# Patient Record
Sex: FEMALE | Race: WHITE | NOT HISPANIC OR LATINO | Employment: STUDENT | URBAN - METROPOLITAN AREA
[De-identification: names, ages, dates, MRNs, and addresses within clinical notes are randomized per-mention and may not be internally consistent; named-entity substitution may affect disease eponyms.]

---

## 2023-07-21 LAB — CHORIOGONADOTROPIN (MIU/ML) IN SER/PLAS: 75 MIU/ML

## 2023-11-09 ENCOUNTER — INITIAL PRENATAL (OUTPATIENT)
Dept: OBSTETRICS AND GYNECOLOGY | Facility: CLINIC | Age: 28
End: 2023-11-09
Payer: COMMERCIAL

## 2023-11-09 DIAGNOSIS — O21.9 NAUSEA AND VOMITING IN PREGNANCY PRIOR TO 22 WEEKS GESTATION (HHS-HCC): ICD-10-CM

## 2023-11-09 DIAGNOSIS — R51.9 PREGNANCY HEADACHE IN FIRST TRIMESTER (HHS-HCC): ICD-10-CM

## 2023-11-09 DIAGNOSIS — N85.A UTERINE SCAR FROM PREVIOUS CESAREAN DELIVERY: ICD-10-CM

## 2023-11-09 DIAGNOSIS — Z34.81 MULTIGRAVIDA IN FIRST TRIMESTER (HHS-HCC): Primary | ICD-10-CM

## 2023-11-09 DIAGNOSIS — O26.891 PREGNANCY HEADACHE IN FIRST TRIMESTER (HHS-HCC): ICD-10-CM

## 2023-11-09 DIAGNOSIS — Z3A.08 8 WEEKS GESTATION OF PREGNANCY (HHS-HCC): ICD-10-CM

## 2023-11-09 PROCEDURE — 0500F INITIAL PRENATAL CARE VISIT: CPT | Performed by: MIDWIFE

## 2023-11-09 RX ORDER — PRENATAL WITH FERROUS FUM AND FOLIC ACID 3080; 920; 120; 400; 22; 1.84; 3; 20; 10; 1; 12; 200; 27; 25; 2 [IU]/1; [IU]/1; MG/1; [IU]/1; MG/1; MG/1; MG/1; MG/1; MG/1; MG/1; UG/1; MG/1; MG/1; MG/1; MG/1
1 TABLET ORAL DAILY
Status: ON HOLD | COMMUNITY
Start: 2023-11-04

## 2023-11-09 NOTE — PROGRESS NOTES
"S: Presents to office to establish prenatal care. Pregnancy planned and her and partner (Se) are very excited, but nervous given her recent loss 2023. Reports previous c/s in  at [facility] was due to \"baby got stuck.\"    -Denies h/o HDP, GDM, PPH, blood transfusion  -Denies h/o abnormal Pap or any h/o gynecologic -surgeries/procedures  -Taking PNVs  -Symptoms include headaches, nausea    O: See flow sheets    A: 29yo  at 8.2 per LMP    P: Reviewed Bp, weight      Oriented to collaborative practice with [facility] delivery and fluid on-call schedule      Discussed prenatal visit schedule      New OB labs      Dating US      Discussed genetic options --> Will consider      Discussed comfort measures for n/v in pregnancy      Discussed comfort measures for h/a in pregnancy --> Recommended 400mg magnesium daily       New OB folder with proof of pregnancy/dental letter      Pap due 2024      RTO in 4 weeks and sooner PRN      Next visit: Review labs, ultrasound  "

## 2023-11-10 ENCOUNTER — LAB (OUTPATIENT)
Dept: LAB | Facility: LAB | Age: 28
End: 2023-11-10
Payer: COMMERCIAL

## 2023-11-10 DIAGNOSIS — Z3A.08 8 WEEKS GESTATION OF PREGNANCY (HHS-HCC): ICD-10-CM

## 2023-11-10 LAB
ABO GROUP (TYPE) IN BLOOD: NORMAL
ANTIBODY SCREEN: NORMAL
APPEARANCE UR: CLEAR
BILIRUB UR STRIP.AUTO-MCNC: NEGATIVE MG/DL
COLOR UR: YELLOW
ERYTHROCYTE [DISTWIDTH] IN BLOOD BY AUTOMATED COUNT: 13.6 % (ref 11.5–14.5)
GLUCOSE UR STRIP.AUTO-MCNC: NEGATIVE MG/DL
HBV SURFACE AG SERPL QL IA: NONREACTIVE
HCT VFR BLD AUTO: 43.7 % (ref 36–46)
HCV AB SER QL: NONREACTIVE
HGB BLD-MCNC: 14 G/DL (ref 12–16)
HIV 1+2 AB+HIV1 P24 AG SERPL QL IA: NONREACTIVE
KETONES UR STRIP.AUTO-MCNC: ABNORMAL MG/DL
LEUKOCYTE ESTERASE UR QL STRIP.AUTO: NEGATIVE
MCH RBC QN AUTO: 27 PG (ref 26–34)
MCHC RBC AUTO-ENTMCNC: 32 G/DL (ref 32–36)
MCV RBC AUTO: 84 FL (ref 80–100)
NITRITE UR QL STRIP.AUTO: NEGATIVE
NRBC BLD-RTO: 0 /100 WBCS (ref 0–0)
PH UR STRIP.AUTO: 5 [PH]
PLATELET # BLD AUTO: 282 X10*3/UL (ref 150–450)
PROT UR STRIP.AUTO-MCNC: NEGATIVE MG/DL
RBC # BLD AUTO: 5.18 X10*6/UL (ref 4–5.2)
RBC # UR STRIP.AUTO: NEGATIVE /UL
RH FACTOR (ANTIGEN D): NORMAL
RUBV IGG SERPL IA-ACNC: 1.7 IA
RUBV IGG SERPL QL IA: POSITIVE
SP GR UR STRIP.AUTO: 1.03
T PALLIDUM AB SER QL: NONREACTIVE
UROBILINOGEN UR STRIP.AUTO-MCNC: <2 MG/DL
WBC # BLD AUTO: 10.1 X10*3/UL (ref 4.4–11.3)

## 2023-11-10 PROCEDURE — 86803 HEPATITIS C AB TEST: CPT

## 2023-11-10 PROCEDURE — 86850 RBC ANTIBODY SCREEN: CPT

## 2023-11-10 PROCEDURE — 36415 COLL VENOUS BLD VENIPUNCTURE: CPT

## 2023-11-10 PROCEDURE — 87661 TRICHOMONAS VAGINALIS AMPLIF: CPT

## 2023-11-10 PROCEDURE — 85027 COMPLETE CBC AUTOMATED: CPT

## 2023-11-10 PROCEDURE — 86901 BLOOD TYPING SEROLOGIC RH(D): CPT

## 2023-11-10 PROCEDURE — 87340 HEPATITIS B SURFACE AG IA: CPT

## 2023-11-10 PROCEDURE — 86317 IMMUNOASSAY INFECTIOUS AGENT: CPT

## 2023-11-10 PROCEDURE — 87389 HIV-1 AG W/HIV-1&-2 AB AG IA: CPT

## 2023-11-10 PROCEDURE — 81003 URINALYSIS AUTO W/O SCOPE: CPT

## 2023-11-10 PROCEDURE — 86780 TREPONEMA PALLIDUM: CPT

## 2023-11-10 PROCEDURE — 87086 URINE CULTURE/COLONY COUNT: CPT

## 2023-11-10 PROCEDURE — 87800 DETECT AGNT MULT DNA DIREC: CPT

## 2023-11-10 PROCEDURE — 86900 BLOOD TYPING SEROLOGIC ABO: CPT

## 2023-11-11 LAB
BACTERIA UR CULT: NORMAL
C TRACH RRNA SPEC QL NAA+PROBE: NEGATIVE
N GONORRHOEA DNA SPEC QL PROBE+SIG AMP: NEGATIVE
REFLEX ADDED, ANEMIA PANEL: NORMAL
T VAGINALIS RRNA SPEC QL NAA+PROBE: NEGATIVE

## 2023-11-13 ENCOUNTER — HOSPITAL ENCOUNTER (OUTPATIENT)
Dept: RADIOLOGY | Facility: HOSPITAL | Age: 28
Discharge: HOME | End: 2023-11-13
Payer: COMMERCIAL

## 2023-11-13 DIAGNOSIS — Z3A.08 8 WEEKS GESTATION OF PREGNANCY (HHS-HCC): ICD-10-CM

## 2023-11-13 PROCEDURE — 76801 OB US < 14 WKS SINGLE FETUS: CPT

## 2023-11-13 PROCEDURE — 76801 OB US < 14 WKS SINGLE FETUS: CPT | Performed by: OBSTETRICS & GYNECOLOGY

## 2023-12-04 ENCOUNTER — TELEPHONE (OUTPATIENT)
Dept: OBSTETRICS AND GYNECOLOGY | Facility: CLINIC | Age: 28
End: 2023-12-04
Payer: COMMERCIAL

## 2023-12-04 DIAGNOSIS — Z3A.11 11 WEEKS GESTATION OF PREGNANCY (HHS-HCC): ICD-10-CM

## 2023-12-07 ENCOUNTER — ROUTINE PRENATAL (OUTPATIENT)
Dept: OBSTETRICS AND GYNECOLOGY | Facility: CLINIC | Age: 28
End: 2023-12-07
Payer: COMMERCIAL

## 2023-12-07 VITALS — SYSTOLIC BLOOD PRESSURE: 108 MMHG | BODY MASS INDEX: 26.31 KG/M2 | DIASTOLIC BLOOD PRESSURE: 63 MMHG | WEIGHT: 168 LBS

## 2023-12-07 DIAGNOSIS — Z3A.12 12 WEEKS GESTATION OF PREGNANCY (HHS-HCC): ICD-10-CM

## 2023-12-07 DIAGNOSIS — Z34.82 MULTIGRAVIDA IN SECOND TRIMESTER (HHS-HCC): Primary | ICD-10-CM

## 2023-12-07 PROCEDURE — 0501F PRENATAL FLOW SHEET: CPT | Performed by: MIDWIFE

## 2023-12-07 NOTE — PROGRESS NOTES
S: EMILIA. Denies SOL/ROM. Intermittent nausea and headaches.     O: See flow sheets    A: 27yo  at 12.2 per LMP c/w 9.6 week US      H/A in pregnancy      Nausea in pregnancy    P: Reviewed BP, weight      Reviewed WNL labs       Reviewed WNL imaging --> US supports LMP dating      Covid series: Completed      Flu vaccine: Declined      Discussed comfort measures for h/a in pregnancy      First check+ ordered      RTO in 4 weeks and sooner PRN      Next visit: Review first check+

## 2023-12-13 ENCOUNTER — HOSPITAL ENCOUNTER (OUTPATIENT)
Dept: RADIOLOGY | Facility: HOSPITAL | Age: 28
Discharge: HOME | End: 2023-12-13
Payer: COMMERCIAL

## 2023-12-13 DIAGNOSIS — Z3A.12 12 WEEKS GESTATION OF PREGNANCY (HHS-HCC): ICD-10-CM

## 2023-12-13 PROCEDURE — 76801 OB US < 14 WKS SINGLE FETUS: CPT

## 2023-12-13 PROCEDURE — 76815 OB US LIMITED FETUS(S): CPT | Performed by: OBSTETRICS & GYNECOLOGY

## 2024-01-04 ENCOUNTER — ROUTINE PRENATAL (OUTPATIENT)
Dept: OBSTETRICS AND GYNECOLOGY | Facility: CLINIC | Age: 29
End: 2024-01-04
Payer: COMMERCIAL

## 2024-01-04 VITALS — BODY MASS INDEX: 26.63 KG/M2 | WEIGHT: 170 LBS | DIASTOLIC BLOOD PRESSURE: 72 MMHG | SYSTOLIC BLOOD PRESSURE: 104 MMHG

## 2024-01-04 DIAGNOSIS — Z3A.16 16 WEEKS GESTATION OF PREGNANCY (HHS-HCC): ICD-10-CM

## 2024-01-04 DIAGNOSIS — Z34.82 MULTIGRAVIDA IN SECOND TRIMESTER (HHS-HCC): Primary | ICD-10-CM

## 2024-01-04 PROCEDURE — 0501F PRENATAL FLOW SHEET: CPT | Performed by: MIDWIFE

## 2024-01-04 NOTE — PROGRESS NOTES
S: EMILIA. Denies SOL/ROM. Endorses good movement! Went to a 3-d US and was told she's having a BOY.    O: See flow sheets    A: 27yo  at 16.2 per LMP c/w 9.6 week US    P: Reviewed BP, weight      Reviewed WNL first check US      NIPS ordered with instructions      Anatomy scan as scheduled the th      RTO in 4 weeks and sooner PRN       Next visit: Review anatomy, NIPS

## 2024-01-05 ENCOUNTER — LAB (OUTPATIENT)
Dept: LAB | Facility: LAB | Age: 29
End: 2024-01-05
Payer: COMMERCIAL

## 2024-01-05 DIAGNOSIS — Z3A.16 16 WEEKS GESTATION OF PREGNANCY (HHS-HCC): ICD-10-CM

## 2024-01-05 PROCEDURE — 36415 COLL VENOUS BLD VENIPUNCTURE: CPT

## 2024-01-29 ENCOUNTER — HOSPITAL ENCOUNTER (OUTPATIENT)
Dept: RADIOLOGY | Facility: HOSPITAL | Age: 29
Discharge: HOME | End: 2024-01-29
Payer: COMMERCIAL

## 2024-01-29 DIAGNOSIS — Z34.90 ENCOUNTER FOR SUPERVISION OF NORMAL PREGNANCY, UNSPECIFIED, UNSPECIFIED TRIMESTER (HHS-HCC): ICD-10-CM

## 2024-01-29 DIAGNOSIS — Z3A.12 12 WEEKS GESTATION OF PREGNANCY (HHS-HCC): ICD-10-CM

## 2024-01-29 PROCEDURE — 76805 OB US >/= 14 WKS SNGL FETUS: CPT

## 2024-01-29 PROCEDURE — 76811 OB US DETAILED SNGL FETUS: CPT | Performed by: OBSTETRICS & GYNECOLOGY

## 2024-01-31 ENCOUNTER — ROUTINE PRENATAL (OUTPATIENT)
Dept: OBSTETRICS AND GYNECOLOGY | Facility: CLINIC | Age: 29
End: 2024-01-31
Payer: COMMERCIAL

## 2024-01-31 VITALS — BODY MASS INDEX: 27.06 KG/M2 | DIASTOLIC BLOOD PRESSURE: 71 MMHG | WEIGHT: 172.8 LBS | SYSTOLIC BLOOD PRESSURE: 116 MMHG

## 2024-01-31 DIAGNOSIS — Z34.82 MULTIGRAVIDA IN SECOND TRIMESTER (HHS-HCC): Primary | ICD-10-CM

## 2024-01-31 DIAGNOSIS — O34.219 PREVIOUS CESAREAN SECTION COMPLICATING PREGNANCY (HHS-HCC): ICD-10-CM

## 2024-01-31 DIAGNOSIS — Z3A.20 20 WEEKS GESTATION OF PREGNANCY (HHS-HCC): ICD-10-CM

## 2024-01-31 PROCEDURE — 0501F PRENATAL FLOW SHEET: CPT | Performed by: MIDWIFE

## 2024-01-31 NOTE — PROGRESS NOTES
S: EMILIA. Denies sol/rom. Endorses flutters. Feels great!    O: See flow sheets    A: 27yo  at 20.1 per LMP c/w 9.6 week US      Previous c/s        P: Reviewed BP, weight      Reviewed/discussed WNL anatomy scan      rrNIPS --> Discussed results and a copy provided to patient      IT'S A BOY!      Flu Vaccine: Declined      RTO in 4 weeks and sooner PRN      EMILIA in 8 weeks scheduled with physician for first time TOLAC consent per guidelines --> MFMU 74.9&

## 2024-02-29 ENCOUNTER — ROUTINE PRENATAL (OUTPATIENT)
Dept: OBSTETRICS AND GYNECOLOGY | Facility: CLINIC | Age: 29
End: 2024-02-29
Payer: COMMERCIAL

## 2024-02-29 VITALS — SYSTOLIC BLOOD PRESSURE: 114 MMHG | WEIGHT: 176 LBS | BODY MASS INDEX: 27.57 KG/M2 | DIASTOLIC BLOOD PRESSURE: 60 MMHG

## 2024-02-29 DIAGNOSIS — N85.A UTERINE SCAR FROM PREVIOUS CESAREAN DELIVERY: ICD-10-CM

## 2024-02-29 DIAGNOSIS — Z3A.24 24 WEEKS GESTATION OF PREGNANCY (HHS-HCC): Primary | ICD-10-CM

## 2024-02-29 DIAGNOSIS — Z34.82 MULTIGRAVIDA IN SECOND TRIMESTER (HHS-HCC): ICD-10-CM

## 2024-02-29 PROCEDURE — 0501F PRENATAL FLOW SHEET: CPT | Performed by: MIDWIFE

## 2024-02-29 NOTE — PROGRESS NOTES
S: EMILIA. Denies SOL/ROM. Endorses good fetal movement.    O: See flow sheets    A: 27yo  at 24.2 per LMP c/w 9.6 week US      Previous C/S    P: Reviewed BP, weight      28 week folder provided, discussed      Plans for L/D: NCB (TOLAC), breastfeeding, unsure pediatrician, No circ      Postpartum birth control: Would like BTL      Discussed options for BTL: [facility] delivery with postpartum BTL vs outpatient after postpartum recovery      Private insurance --> 30 day sterilization consent not needed      28 week labs ordered with instructions      RTO in 4 weeks and sooner PRN      Next visit with Dr. Shook for TOLAC consent and BTL consultation

## 2024-03-25 ENCOUNTER — APPOINTMENT (OUTPATIENT)
Dept: LAB | Facility: LAB | Age: 29
End: 2024-03-25
Payer: COMMERCIAL

## 2024-03-25 ENCOUNTER — LAB (OUTPATIENT)
Dept: LAB | Facility: LAB | Age: 29
End: 2024-03-25
Payer: COMMERCIAL

## 2024-03-25 DIAGNOSIS — Z3A.24 24 WEEKS GESTATION OF PREGNANCY (HHS-HCC): ICD-10-CM

## 2024-03-25 LAB
ERYTHROCYTE [DISTWIDTH] IN BLOOD BY AUTOMATED COUNT: 14 % (ref 11.5–14.5)
GLUCOSE 1H P 50 G GLC PO SERPL-MCNC: 112 MG/DL
HCT VFR BLD AUTO: 42.1 % (ref 36–46)
HGB BLD-MCNC: 13.2 G/DL (ref 12–16)
MCH RBC QN AUTO: 27.2 PG (ref 26–34)
MCHC RBC AUTO-ENTMCNC: 31.4 G/DL (ref 32–36)
MCV RBC AUTO: 87 FL (ref 80–100)
NRBC BLD-RTO: 0 /100 WBCS (ref 0–0)
PLATELET # BLD AUTO: 213 X10*3/UL (ref 150–450)
RBC # BLD AUTO: 4.86 X10*6/UL (ref 4–5.2)
REFLEX ADDED, ANEMIA PANEL: NORMAL
TREPONEMA PALLIDUM IGG+IGM AB [PRESENCE] IN SERUM OR PLASMA BY IMMUNOASSAY: NONREACTIVE
WBC # BLD AUTO: 11.6 X10*3/UL (ref 4.4–11.3)

## 2024-03-25 PROCEDURE — 82947 ASSAY GLUCOSE BLOOD QUANT: CPT

## 2024-03-25 PROCEDURE — 85027 COMPLETE CBC AUTOMATED: CPT

## 2024-03-25 PROCEDURE — 86780 TREPONEMA PALLIDUM: CPT

## 2024-03-25 PROCEDURE — 36415 COLL VENOUS BLD VENIPUNCTURE: CPT

## 2024-03-27 ENCOUNTER — APPOINTMENT (OUTPATIENT)
Dept: OBSTETRICS AND GYNECOLOGY | Facility: CLINIC | Age: 29
End: 2024-03-27
Payer: COMMERCIAL

## 2024-03-28 ENCOUNTER — ROUTINE PRENATAL (OUTPATIENT)
Dept: OBSTETRICS AND GYNECOLOGY | Facility: CLINIC | Age: 29
End: 2024-03-28
Payer: COMMERCIAL

## 2024-03-28 VITALS
DIASTOLIC BLOOD PRESSURE: 70 MMHG | SYSTOLIC BLOOD PRESSURE: 110 MMHG | WEIGHT: 180.5 LBS | BODY MASS INDEX: 30.07 KG/M2 | HEIGHT: 65 IN

## 2024-03-28 DIAGNOSIS — Z30.09 GENERAL COUNSELING AND ADVICE FOR CONTRACEPTIVE MANAGEMENT: ICD-10-CM

## 2024-03-28 DIAGNOSIS — Z3A.28 28 WEEKS GESTATION OF PREGNANCY (HHS-HCC): ICD-10-CM

## 2024-03-28 DIAGNOSIS — N85.A UTERINE SCAR FROM PREVIOUS CESAREAN DELIVERY: Primary | ICD-10-CM

## 2024-03-28 PROCEDURE — 0501F PRENATAL FLOW SHEET: CPT | Performed by: STUDENT IN AN ORGANIZED HEALTH CARE EDUCATION/TRAINING PROGRAM

## 2024-03-28 NOTE — PROGRESS NOTES
Subjective   Patient ID [id number]   Marty Biggs is a 28 y.o.  at 28w2d with a working estimated date of delivery of 2024, by Last Menstrual Period who presents for a routine prenatal visit. Good FM, no OB complaints.    Objective   Physical Exam  Vitals:    24 1550   BP: 110/70      Weight: 81.9 kg (180 lb 8 oz), Pregravid BMI: 28.29  Expected Total Weight Gain: 7 kg (15 lb)-11.5 kg (25 lb)   Fundal height and FHR per prenatal flowsheet    Assessment/Plan     Marty Biggs is a 28 y.o.  at 28w2d with a working estimated date of delivery of 2024, by Last Menstrual Period who presents for a routine prenatal visit.    Reviewed second trimester labs, WNL. Discussed tdap, handout provided - patient declines today but will consider at next visit.    Reviewed delivery planning at length including r/b/a to TOLAC vs planned rCS. Patient reports prior  under GETA (failed epidural) for NRFHT in second stage. Thinks she pushed 2-3 times and then was taken for urgent . Op report not available for review - encouraged to sign ROR today. Explained that a successful  is associated with faster recovery, lower risk of abnormal placentation in future pregnancies, and lower risk of infection, PPH, and VTE. Reviewed risks of  including but not limited to bleeding, infection, damage to surrounding organs, and VTE. Did discuss benefit of planned rCS including scheduled delivery date Discussed that failed TOLAC is associated with higher risk than either successful  or scheduled rCS and that therefore overall risk is integrally related to a women's probability of achieving . Finally, reviewed 0.5-0.9% risk of uterine rupture which would require emergent  and is associated with increased risk of surgical complications and  morbidity. As a result, recommend CEFM and IV access in labor. MFMU score calculated with patient, 74%. Discussed that women with  at least a 60-70% likelihood of achieving  who attempt TOLAC experience the same or less maternal morbidity than women who have an elective rCS. Additionally discussed MFMU score of 70% or higher is associated with similar  morbidity between women attempting TOLAC and women planning an elective rCS. Patient would accept blood transfusion in case of emergency. Discussed that epidural is not required for TOLAC but that general anesthesia would be required if emergent  were needed. Patient expressed understanding and desires TOLAC, consent signed today.    Also discussed bilateral salpingectomy at length. Reviewed permanence, efficacy, risk of ectopic, surgical risk, lack of menstrual benefit or STI prevention, and increased risk of regret in patients under the age of 30. Discussed timing including postpartum (would need [place] [facility] and interval as well as anticipated recovery time. Also discussed alternatives to tubal including vasectomy and LARC. Patient to consider her options and let office know if she would like to be scheduled for interval tubal.    Follow up in 2 weeks for a routine prenatal visit.    40 minutes were spent in the care of this patient. 5 minutes reviewing records, 30 minutes face-to-face, and 5 minutes documenting.    Alice Shook MD

## 2024-04-10 ENCOUNTER — ROUTINE PRENATAL (OUTPATIENT)
Dept: OBSTETRICS AND GYNECOLOGY | Facility: CLINIC | Age: 29
End: 2024-04-10
Payer: COMMERCIAL

## 2024-04-10 VITALS — DIASTOLIC BLOOD PRESSURE: 60 MMHG | SYSTOLIC BLOOD PRESSURE: 102 MMHG | WEIGHT: 181.6 LBS | BODY MASS INDEX: 30.22 KG/M2

## 2024-04-10 DIAGNOSIS — Z20.818 STREP THROAT EXPOSURE: ICD-10-CM

## 2024-04-10 DIAGNOSIS — Z34.83 MULTIGRAVIDA IN THIRD TRIMESTER (HHS-HCC): ICD-10-CM

## 2024-04-10 DIAGNOSIS — Z3A.30 30 WEEKS GESTATION OF PREGNANCY (HHS-HCC): Primary | ICD-10-CM

## 2024-04-10 PROCEDURE — 90715 TDAP VACCINE 7 YRS/> IM: CPT | Performed by: MIDWIFE

## 2024-04-10 PROCEDURE — 90471 IMMUNIZATION ADMIN: CPT | Performed by: MIDWIFE

## 2024-04-10 PROCEDURE — 0501F PRENATAL FLOW SHEET: CPT | Performed by: MIDWIFE

## 2024-04-10 RX ORDER — BREAST PUMP
EACH MISCELLANEOUS
Qty: 1 EACH | Refills: 0 | Status: SHIPPED | OUTPATIENT
Start: 2024-04-10

## 2024-04-10 RX ORDER — AMOXICILLIN 500 MG/1
500 TABLET, FILM COATED ORAL 2 TIMES DAILY
Qty: 20 TABLET | Refills: 0 | Status: SHIPPED | OUTPATIENT
Start: 2024-04-10 | End: 2024-04-20

## 2024-04-10 NOTE — PROGRESS NOTES
S: EMILIA. Denies SOL/ROM. Endorses good fetal movement. Has s/s strep throat. 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    O: See flow sheets    A: 29yo  at 30.1 per LMP c/w 9.6 week US      Strep throat    P: Reviewed BP, weight      Tdap: Given      Reviewed TOLAC consent with Dr. Shook --> MFMU 74%      Reviewed WNL 28 week testing      Amoxicillin to pharmacy      Breast pump ordered to pharmacy      RTO in 2 weeks and sooner PRN

## 2024-04-11 ENCOUNTER — APPOINTMENT (OUTPATIENT)
Dept: OBSTETRICS AND GYNECOLOGY | Facility: CLINIC | Age: 29
End: 2024-04-11
Payer: COMMERCIAL

## 2024-04-24 ENCOUNTER — ROUTINE PRENATAL (OUTPATIENT)
Dept: OBSTETRICS AND GYNECOLOGY | Facility: CLINIC | Age: 29
End: 2024-04-24
Payer: COMMERCIAL

## 2024-04-24 VITALS — DIASTOLIC BLOOD PRESSURE: 74 MMHG | BODY MASS INDEX: 30.55 KG/M2 | SYSTOLIC BLOOD PRESSURE: 118 MMHG | WEIGHT: 183.6 LBS

## 2024-04-24 DIAGNOSIS — N85.A UTERINE SCAR FROM PREVIOUS CESAREAN DELIVERY: ICD-10-CM

## 2024-04-24 DIAGNOSIS — Z3A.32 32 WEEKS GESTATION OF PREGNANCY (HHS-HCC): ICD-10-CM

## 2024-04-24 DIAGNOSIS — Z34.83 MULTIGRAVIDA IN THIRD TRIMESTER (HHS-HCC): Primary | ICD-10-CM

## 2024-04-24 PROCEDURE — 0501F PRENATAL FLOW SHEET: CPT | Performed by: MIDWIFE

## 2024-04-24 NOTE — PROGRESS NOTES
S: EMILIA. Denies sol/rom. Endorses good fetal movement.     O: See flow sheets    A: 30yo  at 32.1 per LMP c/w 9.6 week uS    P: Reviewed BP, weight      Discussed third trimester expectations      RTO in 2 weeks and sooner PRN      Next visit: Review/discuss previous op report and MFMU, arrest of descent?

## 2024-04-25 ENCOUNTER — APPOINTMENT (OUTPATIENT)
Dept: OBSTETRICS AND GYNECOLOGY | Facility: CLINIC | Age: 29
End: 2024-04-25
Payer: COMMERCIAL

## 2024-05-08 ENCOUNTER — ROUTINE PRENATAL (OUTPATIENT)
Dept: OBSTETRICS AND GYNECOLOGY | Facility: CLINIC | Age: 29
End: 2024-05-08
Payer: COMMERCIAL

## 2024-05-08 VITALS — WEIGHT: 184.2 LBS | BODY MASS INDEX: 30.65 KG/M2 | DIASTOLIC BLOOD PRESSURE: 70 MMHG | SYSTOLIC BLOOD PRESSURE: 112 MMHG

## 2024-05-08 DIAGNOSIS — Z3A.34 34 WEEKS GESTATION OF PREGNANCY (HHS-HCC): ICD-10-CM

## 2024-05-08 DIAGNOSIS — N85.A UTERINE SCAR FROM PREVIOUS CESAREAN DELIVERY: ICD-10-CM

## 2024-05-08 DIAGNOSIS — Z34.83 MULTIGRAVIDA IN THIRD TRIMESTER (HHS-HCC): ICD-10-CM

## 2024-05-08 PROCEDURE — 0501F PRENATAL FLOW SHEET: CPT | Performed by: MIDWIFE

## 2024-05-08 NOTE — PROGRESS NOTES
S: EMILIA. Denies sol/rom. Reports an overall change in quality of fetal movement but states she elicits a good response when she performs FKC interventions.    O: See flow sheets    A: 28yo  at 34.1 per LMP c/w 9.6 week US    P :Reviewed BP, weihgt      Counseled MFMU score after discussion with Dr. Shook. Op report states arrest disorder, which changes MFMU to 53.6%. Patient counseled and new TOLAC consent signed      FKC      RTO in 2 weeks and sooner PRN

## 2024-05-09 ENCOUNTER — APPOINTMENT (OUTPATIENT)
Dept: OBSTETRICS AND GYNECOLOGY | Facility: CLINIC | Age: 29
End: 2024-05-09
Payer: COMMERCIAL

## 2024-05-16 ENCOUNTER — ROUTINE PRENATAL (OUTPATIENT)
Dept: OBSTETRICS AND GYNECOLOGY | Facility: CLINIC | Age: 29
End: 2024-05-16
Payer: COMMERCIAL

## 2024-05-16 VITALS — BODY MASS INDEX: 30.85 KG/M2 | WEIGHT: 185.4 LBS | SYSTOLIC BLOOD PRESSURE: 114 MMHG | DIASTOLIC BLOOD PRESSURE: 68 MMHG

## 2024-05-16 DIAGNOSIS — O09.523 MULTIGRAVIDA OF ADVANCED MATERNAL AGE IN THIRD TRIMESTER (HHS-HCC): ICD-10-CM

## 2024-05-16 DIAGNOSIS — Z3A.35 35 WEEKS GESTATION OF PREGNANCY (HHS-HCC): ICD-10-CM

## 2024-05-16 PROCEDURE — 0501F PRENATAL FLOW SHEET: CPT | Performed by: MIDWIFE

## 2024-05-16 NOTE — PROGRESS NOTES
S: EMILIA. Denies sol/rom. Endorses good fetal movement. Has had a discussion with family/support after last appointment given recalculated MFMU and anxiety. Patient states she would strongly feel more comfortable proceeding with a repeat c/s.     O: See flow sheets    A: 28yo  at 35.2 per LMP c/w 9.6 week US    P: Reviewed BP, weight      Discussed patient's feelings and offered support      Scheduled next visit with Dr. Shook in 1 week. Dr. Shook notified.       RTO in 1 week and sooner PRN

## 2024-05-20 PROBLEM — Z34.90 PRENATAL CARE, ANTEPARTUM (HHS-HCC): Status: ACTIVE | Noted: 2024-05-20

## 2024-05-21 ENCOUNTER — PREP FOR PROCEDURE (OUTPATIENT)
Dept: OBSTETRICS AND GYNECOLOGY | Facility: CLINIC | Age: 29
End: 2024-05-21

## 2024-05-21 ENCOUNTER — ROUTINE PRENATAL (OUTPATIENT)
Dept: OBSTETRICS AND GYNECOLOGY | Facility: CLINIC | Age: 29
End: 2024-05-21
Payer: COMMERCIAL

## 2024-05-21 VITALS — WEIGHT: 185.6 LBS | BODY MASS INDEX: 30.89 KG/M2 | DIASTOLIC BLOOD PRESSURE: 68 MMHG | SYSTOLIC BLOOD PRESSURE: 112 MMHG

## 2024-05-21 DIAGNOSIS — O34.219 PREVIOUS CESAREAN SECTION COMPLICATING PREGNANCY (HHS-HCC): Primary | ICD-10-CM

## 2024-05-21 DIAGNOSIS — Z3A.36 36 WEEKS GESTATION OF PREGNANCY (HHS-HCC): ICD-10-CM

## 2024-05-21 DIAGNOSIS — Z34.90 PRENATAL CARE, ANTEPARTUM (HHS-HCC): ICD-10-CM

## 2024-05-21 DIAGNOSIS — N85.A UTERINE SCAR FROM PREVIOUS CESAREAN DELIVERY: Primary | ICD-10-CM

## 2024-05-21 PROCEDURE — 0501F PRENATAL FLOW SHEET: CPT | Performed by: STUDENT IN AN ORGANIZED HEALTH CARE EDUCATION/TRAINING PROGRAM

## 2024-05-21 PROCEDURE — 87081 CULTURE SCREEN ONLY: CPT

## 2024-05-21 RX ORDER — CEFAZOLIN SODIUM 2 G/100ML
2 INJECTION, SOLUTION INTRAVENOUS ONCE
Status: CANCELLED | OUTPATIENT
Start: 2024-05-21 | End: 2024-05-21

## 2024-05-21 NOTE — PROGRESS NOTES
Subjective   Patient ID [id number]   Marty Biggs is a 29 y.o.  at 36w0d with a working estimated date of delivery of 2024, by Last Menstrual Period who presents for a routine prenatal visit. Good FM, no OB complaints.    Objective   Physical Exam  Vitals:    24 0950   BP: 112/68      Weight: 84.2 kg (185 lb 9.6 oz), Pregravid BMI: 28.29  Expected Total Weight Gain: 7 kg (15 lb)-11.5 kg (25 lb)   Fundal height and FHR per prenatal flowsheet    Assessment/Plan     Marty Biggs is a 29 y.o.  at 36w0d with a working estimated date of delivery of 2024, by Last Menstrual Period who presents for a routine prenatal visit.    Discussed TOLAC vs rCS again. MFMU 53.6%. Desires rCS. Reviewed risks of surgery. Agreeable to proceed. Scheduled today for  ay 8:30 AM at 39.0wga. To transfer to physician care. GBS collected, SVE 0/50/-3. Vertex confirmed on BSUS. Remainder of plan per problem list as below.     Problem List Items Addressed This Visit       Previous  section complicating pregnancy ([facility]) - Primary    Overview     -Prior LTCS for arrest of active phase, op report reviewed, confirmed low transverse  -MFMU score = 53.6%  -Desires [facility]         Prenatal care, antepartum ([facility])    Overview     -Last pap (2021): NIL/HRHPV other positive, for repeat postpartum  -s/p COVID vaccine  -Risk-reducing cfDNA  -s/p tdap  -Breast/POPs bridge to vasectomy  rCS scheduled for  at 8:30 AM with Boone    Next Visit  [ ] Sign consent          Other Visit Diagnoses       36 weeks gestation of pregnancy ([facility])        Relevant Orders    Group B Streptococcus (GBS) Prenatal Screen, Culture          Follow up in 1 weeks for a routine prenatal visit.    Alice Shook MD

## 2024-05-22 ENCOUNTER — APPOINTMENT (OUTPATIENT)
Dept: OBSTETRICS AND GYNECOLOGY | Facility: CLINIC | Age: 29
End: 2024-05-22
Payer: COMMERCIAL

## 2024-05-23 ENCOUNTER — APPOINTMENT (OUTPATIENT)
Dept: OBSTETRICS AND GYNECOLOGY | Facility: CLINIC | Age: 29
End: 2024-05-23
Payer: COMMERCIAL

## 2024-05-24 LAB — GP B STREP GENITAL QL CULT: ABNORMAL

## 2024-05-29 ENCOUNTER — APPOINTMENT (OUTPATIENT)
Dept: OBSTETRICS AND GYNECOLOGY | Facility: CLINIC | Age: 29
End: 2024-05-29
Payer: COMMERCIAL

## 2024-05-31 ENCOUNTER — ROUTINE PRENATAL (OUTPATIENT)
Dept: OBSTETRICS AND GYNECOLOGY | Facility: CLINIC | Age: 29
End: 2024-05-31
Payer: COMMERCIAL

## 2024-05-31 VITALS — DIASTOLIC BLOOD PRESSURE: 74 MMHG | BODY MASS INDEX: 31.12 KG/M2 | SYSTOLIC BLOOD PRESSURE: 110 MMHG | WEIGHT: 187 LBS

## 2024-05-31 DIAGNOSIS — Z3A.37 37 WEEKS GESTATION OF PREGNANCY (HHS-HCC): ICD-10-CM

## 2024-05-31 DIAGNOSIS — Z34.90 PRENATAL CARE, ANTEPARTUM (HHS-HCC): ICD-10-CM

## 2024-05-31 DIAGNOSIS — O34.219 PREVIOUS CESAREAN SECTION COMPLICATING PREGNANCY (HHS-HCC): Primary | ICD-10-CM

## 2024-05-31 PROCEDURE — 0501F PRENATAL FLOW SHEET: CPT | Performed by: STUDENT IN AN ORGANIZED HEALTH CARE EDUCATION/TRAINING PROGRAM

## 2024-05-31 NOTE — PROGRESS NOTES
Subjective   Patient ID [id number]   Marty Biggs is a 29 y.o.  at 37w3d with a working estimated date of delivery of 2024, by Last Menstrual Period who presents for a routine prenatal visit. Good FM, no OB complaints.    Objective   Physical Exam  Vitals:    24 0845   BP: 110/74      Weight: 84.8 kg (187 lb), Pregravid BMI: 28.29  Expected Total Weight Gain: Could not be calculated   Fundal height and FHR per prenatal flowsheet    Assessment/Plan     Marty Biggs is a 29 y.o.  at 37w3d with a working estimated date of delivery of 2024, by Last Menstrual Period who presents for a routine prenatal visit.    rCS scheduled, surgical consent signed today. Labor precautions provided. Remainder of plan per problem list as below.     Problem List Items Addressed This Visit       Previous  section complicating pregnancy ([facility]) - Primary    Overview     -Prior LTCS for arrest of active phase, op report reviewed, confirmed low transverse  -MFMU score = 53.6%  -Desires rCS, transferred to physician care at 36wga         Prenatal care, antepartum ([facility])    Overview     -Last pap (2021): NIL/HRHPV other positive, for repeat postpartum  -s/p COVID vaccine  -Risk-reducing cfDNA  -s/p tdap  -Breast/POPs bridge to vasectomy  rCS scheduled for  at 8:30 AM with Boone, surgical consent signed           Other Visit Diagnoses       37 weeks gestation of pregnancy ([facility])              Follow up in 1 weeks for a routine prenatal visit.    Alice Shook MD

## 2024-06-03 PROBLEM — N85.A UTERINE SCAR FROM PREVIOUS CESAREAN DELIVERY: Status: RESOLVED | Noted: 2024-05-21 | Resolved: 2024-06-03

## 2024-06-04 ENCOUNTER — ROUTINE PRENATAL (OUTPATIENT)
Dept: OBSTETRICS AND GYNECOLOGY | Facility: CLINIC | Age: 29
End: 2024-06-04
Payer: COMMERCIAL

## 2024-06-04 VITALS — SYSTOLIC BLOOD PRESSURE: 114 MMHG | BODY MASS INDEX: 31.12 KG/M2 | WEIGHT: 187 LBS | DIASTOLIC BLOOD PRESSURE: 83 MMHG

## 2024-06-04 DIAGNOSIS — Z3A.38 38 WEEKS GESTATION OF PREGNANCY (HHS-HCC): ICD-10-CM

## 2024-06-04 DIAGNOSIS — Z34.90 PRENATAL CARE, ANTEPARTUM (HHS-HCC): Primary | ICD-10-CM

## 2024-06-04 DIAGNOSIS — O34.219 PREVIOUS CESAREAN SECTION COMPLICATING PREGNANCY (HHS-HCC): ICD-10-CM

## 2024-06-04 PROCEDURE — 0501F PRENATAL FLOW SHEET: CPT | Performed by: STUDENT IN AN ORGANIZED HEALTH CARE EDUCATION/TRAINING PROGRAM

## 2024-06-04 NOTE — PROGRESS NOTES
Subjective   Patient ID [id number]   Marty Biggs is a 29 y.o.  at 38w0d with a working estimated date of delivery of 2024, by Last Menstrual Period who presents for a routine prenatal visit. Good FM, had some ctx last night that resolved. No LOF or VB.    Objective   Physical Exam  Vitals:    24 0949   BP: 114/83      Weight: 84.8 kg (187 lb), Pregravid BMI: 28.29  Expected Total Weight Gain: Could not be calculated   Fundal height and FHR per prenatal flowsheet    Assessment/Plan     Marty Biggs is a 29 y.o.  at 38w0d with a working estimated date of delivery of 2024, by Last Menstrual Period who presents for a routine prenatal visit.    rCS scheduled for next week. Labor precautions provided. Remainder of plan per problem list as below.     Problem List Items Addressed This Visit       Previous  section complicating pregnancy ([facility])    Overview     -Prior LTCS for arrest of active phase, op report reviewed, confirmed low transverse  -MFMU score = 53.6%  -Desires rCS, transferred to physician care at 36wga         Prenatal care, antepartum ([facility]) - Primary    Overview     -Last pap (2021): NIL/HRHPV other positive, for repeat postpartum  -s/p COVID vaccine  -Risk-reducing cfDNA  -s/p tdap  -Breast/POPs bridge to vasectomy  rCS scheduled for  at 8:30 AM with mari Shook consent signed           Other Visit Diagnoses       38 weeks gestation of pregnancy ([facility])              Follow-up postpartum    Alice Shook MD

## 2024-06-05 ENCOUNTER — APPOINTMENT (OUTPATIENT)
Dept: OBSTETRICS AND GYNECOLOGY | Facility: CLINIC | Age: 29
End: 2024-06-05
Payer: COMMERCIAL

## 2024-06-06 ENCOUNTER — HOSPITAL ENCOUNTER (OUTPATIENT)
Facility: HOSPITAL | Age: 29
Discharge: HOME | End: 2024-06-06
Attending: STUDENT IN AN ORGANIZED HEALTH CARE EDUCATION/TRAINING PROGRAM | Admitting: STUDENT IN AN ORGANIZED HEALTH CARE EDUCATION/TRAINING PROGRAM
Payer: COMMERCIAL

## 2024-06-06 ENCOUNTER — TELEPHONE (OUTPATIENT)
Dept: OBSTETRICS AND GYNECOLOGY | Facility: CLINIC | Age: 29
End: 2024-06-06

## 2024-06-06 ENCOUNTER — ANESTHESIA EVENT (OUTPATIENT)
Dept: OBSTETRICS AND GYNECOLOGY | Facility: HOSPITAL | Age: 29
End: 2024-06-06
Payer: COMMERCIAL

## 2024-06-06 VITALS
SYSTOLIC BLOOD PRESSURE: 108 MMHG | WEIGHT: 185.85 LBS | RESPIRATION RATE: 18 BRPM | TEMPERATURE: 97.2 F | HEART RATE: 88 BPM | HEIGHT: 65 IN | OXYGEN SATURATION: 98 % | BODY MASS INDEX: 30.96 KG/M2 | DIASTOLIC BLOOD PRESSURE: 67 MMHG

## 2024-06-06 PROBLEM — N85.A UTERINE SCAR FROM PREVIOUS CESAREAN DELIVERY: Status: ACTIVE | Noted: 2024-05-21

## 2024-06-06 LAB
ABO GROUP (TYPE) IN BLOOD: NORMAL
ANTIBODY SCREEN: NORMAL
ERYTHROCYTE [DISTWIDTH] IN BLOOD BY AUTOMATED COUNT: 13.5 % (ref 11.5–14.5)
HCT VFR BLD AUTO: 42.5 % (ref 36–46)
HGB BLD-MCNC: 14 G/DL (ref 12–16)
MCH RBC QN AUTO: 27.6 PG (ref 26–34)
MCHC RBC AUTO-ENTMCNC: 32.9 G/DL (ref 32–36)
MCV RBC AUTO: 84 FL (ref 80–100)
NRBC BLD-RTO: 0 /100 WBCS (ref 0–0)
PLATELET # BLD AUTO: 227 X10*3/UL (ref 150–450)
RBC # BLD AUTO: 5.07 X10*6/UL (ref 4–5.2)
RH FACTOR (ANTIGEN D): NORMAL
WBC # BLD AUTO: 15.6 X10*3/UL (ref 4.4–11.3)

## 2024-06-06 PROCEDURE — 59025 FETAL NON-STRESS TEST: CPT | Performed by: STUDENT IN AN ORGANIZED HEALTH CARE EDUCATION/TRAINING PROGRAM

## 2024-06-06 PROCEDURE — 86900 BLOOD TYPING SEROLOGIC ABO: CPT | Performed by: STUDENT IN AN ORGANIZED HEALTH CARE EDUCATION/TRAINING PROGRAM

## 2024-06-06 PROCEDURE — 86901 BLOOD TYPING SEROLOGIC RH(D): CPT | Performed by: STUDENT IN AN ORGANIZED HEALTH CARE EDUCATION/TRAINING PROGRAM

## 2024-06-06 PROCEDURE — 85027 COMPLETE CBC AUTOMATED: CPT | Performed by: STUDENT IN AN ORGANIZED HEALTH CARE EDUCATION/TRAINING PROGRAM

## 2024-06-06 PROCEDURE — 36415 COLL VENOUS BLD VENIPUNCTURE: CPT | Performed by: STUDENT IN AN ORGANIZED HEALTH CARE EDUCATION/TRAINING PROGRAM

## 2024-06-06 PROCEDURE — 86780 TREPONEMA PALLIDUM: CPT | Mod: STJLAB | Performed by: STUDENT IN AN ORGANIZED HEALTH CARE EDUCATION/TRAINING PROGRAM

## 2024-06-06 PROCEDURE — 99214 OFFICE O/P EST MOD 30 MIN: CPT | Performed by: STUDENT IN AN ORGANIZED HEALTH CARE EDUCATION/TRAINING PROGRAM

## 2024-06-06 RX ORDER — LABETALOL HYDROCHLORIDE 5 MG/ML
20 INJECTION, SOLUTION INTRAVENOUS ONCE AS NEEDED
Status: DISCONTINUED | OUTPATIENT
Start: 2024-06-06 | End: 2024-06-06 | Stop reason: HOSPADM

## 2024-06-06 RX ORDER — ONDANSETRON 4 MG/1
4 TABLET, FILM COATED ORAL EVERY 6 HOURS PRN
Status: DISCONTINUED | OUTPATIENT
Start: 2024-06-06 | End: 2024-06-06 | Stop reason: HOSPADM

## 2024-06-06 RX ORDER — NIFEDIPINE 10 MG/1
10 CAPSULE ORAL ONCE AS NEEDED
Status: DISCONTINUED | OUTPATIENT
Start: 2024-06-06 | End: 2024-06-06 | Stop reason: HOSPADM

## 2024-06-06 RX ORDER — ONDANSETRON HYDROCHLORIDE 2 MG/ML
4 INJECTION, SOLUTION INTRAVENOUS EVERY 6 HOURS PRN
Status: DISCONTINUED | OUTPATIENT
Start: 2024-06-06 | End: 2024-06-06 | Stop reason: HOSPADM

## 2024-06-06 RX ORDER — LIDOCAINE HYDROCHLORIDE 10 MG/ML
0.5 INJECTION INFILTRATION; PERINEURAL ONCE AS NEEDED
Status: DISCONTINUED | OUTPATIENT
Start: 2024-06-06 | End: 2024-06-06 | Stop reason: HOSPADM

## 2024-06-06 RX ORDER — HYDRALAZINE HYDROCHLORIDE 20 MG/ML
5 INJECTION INTRAMUSCULAR; INTRAVENOUS ONCE AS NEEDED
Status: DISCONTINUED | OUTPATIENT
Start: 2024-06-06 | End: 2024-06-06 | Stop reason: HOSPADM

## 2024-06-06 SDOH — SOCIAL STABILITY: SOCIAL INSECURITY: HAS ANYONE EVER THREATENED TO HURT YOUR FAMILY OR YOUR PETS?: NO

## 2024-06-06 SDOH — ECONOMIC STABILITY: HOUSING INSECURITY: DO YOU FEEL UNSAFE GOING BACK TO THE PLACE WHERE YOU ARE LIVING?: NO

## 2024-06-06 SDOH — SOCIAL STABILITY: SOCIAL INSECURITY: DOES ANYONE TRY TO KEEP YOU FROM HAVING/CONTACTING OTHER FRIENDS OR DOING THINGS OUTSIDE YOUR HOME?: NO

## 2024-06-06 SDOH — SOCIAL STABILITY: SOCIAL INSECURITY: ABUSE SCREEN: ADULT

## 2024-06-06 SDOH — HEALTH STABILITY: MENTAL HEALTH: NON-SPECIFIC ACTIVE SUICIDAL THOUGHTS (PAST 1 MONTH): NO

## 2024-06-06 SDOH — SOCIAL STABILITY: SOCIAL INSECURITY: ARE THERE ANY APPARENT SIGNS OF INJURIES/BEHAVIORS THAT COULD BE RELATED TO ABUSE/NEGLECT?: NO

## 2024-06-06 SDOH — HEALTH STABILITY: MENTAL HEALTH: WERE YOU ABLE TO COMPLETE ALL THE BEHAVIORAL HEALTH SCREENINGS?: YES

## 2024-06-06 SDOH — HEALTH STABILITY: MENTAL HEALTH: WISH TO BE DEAD (PAST 1 MONTH): NO

## 2024-06-06 SDOH — SOCIAL STABILITY: SOCIAL INSECURITY: PHYSICAL ABUSE: DENIES

## 2024-06-06 SDOH — SOCIAL STABILITY: SOCIAL INSECURITY: VERBAL ABUSE: DENIES

## 2024-06-06 SDOH — HEALTH STABILITY: MENTAL HEALTH: SUICIDAL BEHAVIOR (LIFETIME): NO

## 2024-06-06 SDOH — HEALTH STABILITY: MENTAL HEALTH: HAVE YOU USED ANY PRESCRIPTION DRUGS OTHER THAN PRESCRIBED IN THE PAST 12 MONTHS?: NO

## 2024-06-06 SDOH — SOCIAL STABILITY: SOCIAL INSECURITY: DO YOU FEEL ANYONE HAS EXPLOITED OR TAKEN ADVANTAGE OF YOU FINANCIALLY OR OF YOUR PERSONAL PROPERTY?: NO

## 2024-06-06 SDOH — HEALTH STABILITY: MENTAL HEALTH: HAVE YOU USED ANY SUBSTANCES (CANABIS, COCAINE, HEROIN, HALLUCINOGENS, INHALANTS, ETC.) IN THE PAST 12 MONTHS?: NO

## 2024-06-06 SDOH — SOCIAL STABILITY: SOCIAL INSECURITY: ARE YOU OR HAVE YOU BEEN THREATENED OR ABUSED PHYSICALLY, EMOTIONALLY, OR SEXUALLY BY ANYONE?: NO

## 2024-06-06 SDOH — SOCIAL STABILITY: SOCIAL INSECURITY: HAVE YOU HAD THOUGHTS OF HARMING ANYONE ELSE?: NO

## 2024-06-06 SDOH — SOCIAL STABILITY: SOCIAL INSECURITY: HAVE YOU HAD ANY THOUGHTS OF HARMING ANYONE ELSE?: NO

## 2024-06-06 ASSESSMENT — LIFESTYLE VARIABLES
AUDIT-C TOTAL SCORE: 0
HOW MANY STANDARD DRINKS CONTAINING ALCOHOL DO YOU HAVE ON A TYPICAL DAY: PATIENT DOES NOT DRINK
AUDIT-C TOTAL SCORE: 0
SKIP TO QUESTIONS 9-10: 1
HOW OFTEN DO YOU HAVE A DRINK CONTAINING ALCOHOL: NEVER
HOW OFTEN DO YOU HAVE 6 OR MORE DRINKS ON ONE OCCASION: NEVER

## 2024-06-06 ASSESSMENT — PATIENT HEALTH QUESTIONNAIRE - PHQ9
SUM OF ALL RESPONSES TO PHQ9 QUESTIONS 1 & 2: 0
2. FEELING DOWN, DEPRESSED OR HOPELESS: NOT AT ALL
1. LITTLE INTEREST OR PLEASURE IN DOING THINGS: NOT AT ALL

## 2024-06-06 ASSESSMENT — ACTIVITIES OF DAILY LIVING (ADL)
HEARING - LEFT EAR: FUNCTIONAL
GROOMING: INDEPENDENT
ADEQUATE_TO_COMPLETE_ADL: YES
WALKS IN HOME: INDEPENDENT
JUDGMENT_ADEQUATE_SAFELY_COMPLETE_DAILY_ACTIVITIES: YES
TOILETING: INDEPENDENT
FEEDING YOURSELF: INDEPENDENT
BATHING: INDEPENDENT
DRESSING YOURSELF: INDEPENDENT
PATIENT'S MEMORY ADEQUATE TO SAFELY COMPLETE DAILY ACTIVITIES?: YES
HEARING - RIGHT EAR: FUNCTIONAL

## 2024-06-06 ASSESSMENT — PAIN SCALES - GENERAL
PAINLEVEL: 3
PAINLEVEL_OUTOF10: 1
PAINLEVEL_OUTOF10: 3

## 2024-06-06 NOTE — TELEPHONE ENCOUNTER
38.2 wk ob called ob line c/o bleeding.  She is scheduled for her  on .  Patient has been experiencing  irregular contractions/cramping on and off, becoming more uncomfortable.  Patient has been experiencing pink/red bleeding, noticing on tissue and in toilet.  Denies LOF or leaking.  No recent intercourse, constipation or cervical exam. + FM    Message sent to on call physician, Dr Shook,    Please advise if she should go in to triage?     Per Dr Shook, patient advised NPO and to make her way into [facility] OB triage. Patient states she last ate breakfast earlier this morning about 3 hours ago.  Advised no further food or drink until she is evaluated in ob triage.  Patient has to wait for  to get home, maybe 1-1 1/2 hours.  Advised to find a ride in if its going to be longer than that and he can meet her at [facility].    Dr Shook made aware.

## 2024-06-06 NOTE — H&P
Obstetrical Admission History and Physical     Marty Prasad is a 29 y.o.  at 38w2d by LMP c/w 9 week scan presenting for contractions and spotting    Chief Complaint: Vaginal Bleeding (Spotting, started at 0800)    Assessment/Plan    Contractions/Spotting  -Prior LTCS x1 with plan for repeat    -SVE 1.5/80/-3 -> unchanged on 2 hour recheck  -Is having contractions on TOCO but irregular and not painful per patient  -SSE with brown mucus, no bright red blood or active bleeding  -Reactive NST  -No evidence of labor at this time. Suspicion for abruption or uterine rupture extremely low given lack of painful contractions, reassuring fetal monitoring, and only brown mucus in vault  -Normotensive  -Discussed  risk of early term delivery. In the absence of labor with otherwise reassuring maternal and fetal status, recommend expectant management at this time. Has rCS scheduled in five days.     Dispo: stable for discharge home with strict return precautions    Alice Shook MD     Active Problems:  There are no active Hospital Problems.      Pregnancy Problems (from 23 to present)       Problem Noted Resolved    Prenatal care, antepartum ([facility]) 2024 by Alice Shook MD No    Priority:  Medium      Overview Addendum 2024  9:08 AM by Alice Shook MD     -Last pap (2021): NIL/HRHPV other positive, for repeat postpartum  -s/p COVID vaccine  -Risk-reducing cfDNA  -s/p tdap  -Breast/POPs bridge to vasectomy  rCS scheduled for  at 8:30 AM with mari Shook consent signed                Subjective   Marty is here complaining of contractions and spotting. Has been having irregular contractions for days, unable to quantify them but not too painful. Started having light pink mucus spotting with wiping this AM. No pain in between contractions. Good FM, no LOF. Last ate cereal at 1000 this AM.     Pregnancy notable for:  -Prior  for arrest of active phase with  desire for repeat     Obstetrical History   OB History    Para Term  AB Living   3 1 1 0 1 1   SAB IAB Ectopic Multiple Live Births   1 0 0 0 1      # Outcome Date GA Lbr Miguel/2nd Weight Sex Delivery Anes PTL Lv   3 Current            2 Term 01/07/15 39w0d  3.459 kg F CS-Unspec Spinal N [id number]      Complications: Shoulder Dystocia, Failure to Progress in Second Stage   1 SAB  5w0d    Complete          Past Medical History  History reviewed. No pertinent past medical history.     Past Surgical History   Past Surgical History:   Procedure Laterality Date     SECTION, CLASSIC       Social History  Social History     Tobacco Use    Smoking status: Never    Smokeless tobacco: Never   Substance Use Topics    Alcohol use: Never     Substance and Sexual Activity   Drug Use Never       Allergies  Patient has no known allergies.     Medications  Medications Prior to Admission   Medication Sig Dispense Refill Last Dose    breast pump device Double electric breast pump 1 each 0     Prenatal Vitamin Plus Low Iron 27 mg iron- 1 mg tablet Take 1 tablet by mouth once daily.          Objective    Last Vitals  Temp Pulse Resp BP MAP O2 Sat   36.2 °C (97.2 °F) 101 18 125/74   98 %     Physical Examination  Gen: awake, alert  Head: NCAT  HEENT: moist mucus membranes  Pulm: breathing comfortably on room air  CV: warm and well-perfused  Abd: gravid, non-tender  Neuro: alert and oriented  Psych: appropriate affect     FHT: 140, moderate variability, + accels, -decels   TOCO: ctx q5 min  SVE: 1.5/80/-3 -> unchanged on two hour recheck  SSE: brown mucus in vault, no bright red blood or bleeding with valsalva  BSUS: cephalic    Lab Review  Labs in chart were reviewed.

## 2024-06-06 NOTE — PROCEDURES
Marty Prasad, a  at 38w2d with an JAIME of 2024, by Last Menstrual Period, was seen at [facility] for a nonstress test.    Non-Stress Test   Baseline Fetal Heart Rate for Non-Stress Test: 140 BPM  Variability in Waveform for Non-Stress Test: Moderate  Accelerations in Non-Stress Test: Yes  Decelerations in Non-Stress Test: None  Contractions in Non-Stress Test: Regular  NST Contraction Frequency: q5 min  Acoustic Stimulator for Non-Stress Test: No  Interpretation of Non-Stress Test   Interpretation of Non-Stress Test: Reactive         Alice Shook MD

## 2024-06-07 LAB — TREPONEMA PALLIDUM IGG+IGM AB [PRESENCE] IN SERUM OR PLASMA BY IMMUNOASSAY: NONREACTIVE

## 2024-06-09 ENCOUNTER — ANESTHESIA EVENT (OUTPATIENT)
Dept: OBSTETRICS AND GYNECOLOGY | Facility: HOSPITAL | Age: 29
End: 2024-06-09
Payer: COMMERCIAL

## 2024-06-09 ENCOUNTER — HOSPITAL ENCOUNTER (INPATIENT)
Facility: HOSPITAL | Age: 29
LOS: 2 days | Discharge: HOME | End: 2024-06-11
Attending: STUDENT IN AN ORGANIZED HEALTH CARE EDUCATION/TRAINING PROGRAM | Admitting: STUDENT IN AN ORGANIZED HEALTH CARE EDUCATION/TRAINING PROGRAM
Payer: COMMERCIAL

## 2024-06-09 ENCOUNTER — ANESTHESIA (OUTPATIENT)
Dept: OBSTETRICS AND GYNECOLOGY | Facility: HOSPITAL | Age: 29
End: 2024-06-09
Payer: COMMERCIAL

## 2024-06-09 ENCOUNTER — HOSPITAL ENCOUNTER (OUTPATIENT)
Facility: HOSPITAL | Age: 29
Setting detail: SURGERY ADMIT
End: 2024-06-09
Attending: STUDENT IN AN ORGANIZED HEALTH CARE EDUCATION/TRAINING PROGRAM | Admitting: STUDENT IN AN ORGANIZED HEALTH CARE EDUCATION/TRAINING PROGRAM
Payer: COMMERCIAL

## 2024-06-09 DIAGNOSIS — G89.18 POSTOPERATIVE PAIN: ICD-10-CM

## 2024-06-09 DIAGNOSIS — Z87.59: Primary | ICD-10-CM

## 2024-06-09 DIAGNOSIS — R52 POSTPARTUM PAIN (HHS-HCC): ICD-10-CM

## 2024-06-09 DIAGNOSIS — K59.00 CONSTIPATION, UNSPECIFIED CONSTIPATION TYPE: ICD-10-CM

## 2024-06-09 LAB
ABO GROUP (TYPE) IN BLOOD: NORMAL
ANTIBODY SCREEN: NORMAL
BASE EXCESS BLDCOA CALC-SCNC: -4.5 MMOL/L (ref -10.8–-0.5)
BASE EXCESS BLDCOV CALC-SCNC: -2.6 MMOL/L (ref -8.1–-0.5)
BODY TEMPERATURE: ABNORMAL
BODY TEMPERATURE: ABNORMAL
ERYTHROCYTE [DISTWIDTH] IN BLOOD BY AUTOMATED COUNT: 13.4 % (ref 11.5–14.5)
HCO3 BLDCOA-SCNC: 23.6 MMOL/L (ref 15–29)
HCO3 BLDCOV-SCNC: 21.8 MMOL/L (ref 16–26)
HCT VFR BLD AUTO: 43.7 % (ref 36–46)
HGB BLD-MCNC: 14 G/DL (ref 12–16)
INHALED O2 CONCENTRATION: 21 %
INHALED O2 CONCENTRATION: 21 %
MCH RBC QN AUTO: 27.3 PG (ref 26–34)
MCHC RBC AUTO-ENTMCNC: 32 G/DL (ref 32–36)
MCV RBC AUTO: 85 FL (ref 80–100)
NRBC BLD-RTO: 0 /100 WBCS (ref 0–0)
OXYHGB MFR BLDCOA: 32.4 % (ref 94–98)
OXYHGB MFR BLDCOV: 70.5 % (ref 94–98)
PCO2 BLDCOA: 55 MM HG (ref 31–75)
PCO2 BLDCOV: 36 MM HG (ref 22–53)
PH BLDCOA: 7.24 PH (ref 7.08–7.39)
PH BLDCOV: 7.39 PH (ref 7.19–7.47)
PLATELET # BLD AUTO: 251 X10*3/UL (ref 150–450)
PO2 BLDCOA: 26 MM HG (ref 5–31)
PO2 BLDCOV: 39 MM HG (ref 13–37)
RBC # BLD AUTO: 5.13 X10*6/UL (ref 4–5.2)
RH FACTOR (ANTIGEN D): NORMAL
SAO2 % BLDCOA: 33 % (ref 3–69)
SAO2 % BLDCOV: 72 % (ref 16–84)
TEST COMMENT: ABNORMAL
TEST COMMENT: ABNORMAL
WBC # BLD AUTO: 15.3 X10*3/UL (ref 4.4–11.3)

## 2024-06-09 PROCEDURE — 59514 CESAREAN DELIVERY ONLY: CPT | Performed by: STUDENT IN AN ORGANIZED HEALTH CARE EDUCATION/TRAINING PROGRAM

## 2024-06-09 PROCEDURE — 2500000005 HC RX 250 GENERAL PHARMACY W/O HCPCS: Performed by: NURSE ANESTHETIST, CERTIFIED REGISTERED

## 2024-06-09 PROCEDURE — 1210000001 HC SEMI-PRIVATE ROOM DAILY

## 2024-06-09 PROCEDURE — 3700000014 HC AN EPIDURAL BLOCK CHARGE: Performed by: STUDENT IN AN ORGANIZED HEALTH CARE EDUCATION/TRAINING PROGRAM

## 2024-06-09 PROCEDURE — 85027 COMPLETE CBC AUTOMATED: CPT | Performed by: STUDENT IN AN ORGANIZED HEALTH CARE EDUCATION/TRAINING PROGRAM

## 2024-06-09 PROCEDURE — 2500000004 HC RX 250 GENERAL PHARMACY W/ HCPCS (ALT 636 FOR OP/ED): Mod: JZ | Performed by: STUDENT IN AN ORGANIZED HEALTH CARE EDUCATION/TRAINING PROGRAM

## 2024-06-09 PROCEDURE — 2500000004 HC RX 250 GENERAL PHARMACY W/ HCPCS (ALT 636 FOR OP/ED): Performed by: NURSE ANESTHETIST, CERTIFIED REGISTERED

## 2024-06-09 PROCEDURE — 2500000004 HC RX 250 GENERAL PHARMACY W/ HCPCS (ALT 636 FOR OP/ED): Performed by: STUDENT IN AN ORGANIZED HEALTH CARE EDUCATION/TRAINING PROGRAM

## 2024-06-09 PROCEDURE — 2500000001 HC RX 250 WO HCPCS SELF ADMINISTERED DRUGS (ALT 637 FOR MEDICARE OP): Performed by: NURSE ANESTHETIST, CERTIFIED REGISTERED

## 2024-06-09 PROCEDURE — 2500000005 HC RX 250 GENERAL PHARMACY W/O HCPCS: Performed by: STUDENT IN AN ORGANIZED HEALTH CARE EDUCATION/TRAINING PROGRAM

## 2024-06-09 PROCEDURE — 7100000016 HC LABOR RECOVERY PER HOUR: Performed by: STUDENT IN AN ORGANIZED HEALTH CARE EDUCATION/TRAINING PROGRAM

## 2024-06-09 PROCEDURE — 59050 FETAL MONITOR W/REPORT: CPT

## 2024-06-09 PROCEDURE — 86901 BLOOD TYPING SEROLOGIC RH(D): CPT | Performed by: STUDENT IN AN ORGANIZED HEALTH CARE EDUCATION/TRAINING PROGRAM

## 2024-06-09 PROCEDURE — 51702 INSERT TEMP BLADDER CATH: CPT

## 2024-06-09 PROCEDURE — 82805 BLOOD GASES W/O2 SATURATION: CPT | Performed by: STUDENT IN AN ORGANIZED HEALTH CARE EDUCATION/TRAINING PROGRAM

## 2024-06-09 PROCEDURE — 2720000007 HC OR 272 NO HCPCS: Performed by: STUDENT IN AN ORGANIZED HEALTH CARE EDUCATION/TRAINING PROGRAM

## 2024-06-09 PROCEDURE — 36415 COLL VENOUS BLD VENIPUNCTURE: CPT | Performed by: STUDENT IN AN ORGANIZED HEALTH CARE EDUCATION/TRAINING PROGRAM

## 2024-06-09 PROCEDURE — 86780 TREPONEMA PALLIDUM: CPT | Mod: STJLAB | Performed by: STUDENT IN AN ORGANIZED HEALTH CARE EDUCATION/TRAINING PROGRAM

## 2024-06-09 PROCEDURE — 7210000002 HC LABOR PER HOUR

## 2024-06-09 RX ORDER — NIFEDIPINE 10 MG/1
10 CAPSULE ORAL ONCE AS NEEDED
Status: DISCONTINUED | OUTPATIENT
Start: 2024-06-09 | End: 2024-06-11 | Stop reason: HOSPADM

## 2024-06-09 RX ORDER — HYDROMORPHONE HYDROCHLORIDE 1 MG/ML
0.2 INJECTION, SOLUTION INTRAMUSCULAR; INTRAVENOUS; SUBCUTANEOUS EVERY 5 MIN PRN
Status: DISCONTINUED | OUTPATIENT
Start: 2024-06-09 | End: 2024-06-11 | Stop reason: HOSPADM

## 2024-06-09 RX ORDER — HYDRALAZINE HYDROCHLORIDE 20 MG/ML
5 INJECTION INTRAMUSCULAR; INTRAVENOUS ONCE AS NEEDED
Status: DISCONTINUED | OUTPATIENT
Start: 2024-06-09 | End: 2024-06-09 | Stop reason: SDUPTHER

## 2024-06-09 RX ORDER — KETOROLAC TROMETHAMINE 30 MG/ML
30 INJECTION, SOLUTION INTRAMUSCULAR; INTRAVENOUS EVERY 6 HOURS
Status: COMPLETED | OUTPATIENT
Start: 2024-06-09 | End: 2024-06-10

## 2024-06-09 RX ORDER — LABETALOL HYDROCHLORIDE 5 MG/ML
20 INJECTION, SOLUTION INTRAVENOUS ONCE AS NEEDED
Status: DISCONTINUED | OUTPATIENT
Start: 2024-06-09 | End: 2024-06-09 | Stop reason: SDUPTHER

## 2024-06-09 RX ORDER — ONDANSETRON 4 MG/1
4 TABLET, FILM COATED ORAL EVERY 6 HOURS PRN
Status: DISCONTINUED | OUTPATIENT
Start: 2024-06-09 | End: 2024-06-11 | Stop reason: HOSPADM

## 2024-06-09 RX ORDER — SCOLOPAMINE TRANSDERMAL SYSTEM 1 MG/1
PATCH, EXTENDED RELEASE TRANSDERMAL AS NEEDED
Status: DISCONTINUED | OUTPATIENT
Start: 2024-06-09 | End: 2024-06-09

## 2024-06-09 RX ORDER — LIDOCAINE HYDROCHLORIDE 10 MG/ML
0.5 INJECTION INFILTRATION; PERINEURAL ONCE AS NEEDED
Status: DISCONTINUED | OUTPATIENT
Start: 2024-06-09 | End: 2024-06-11 | Stop reason: HOSPADM

## 2024-06-09 RX ORDER — KETOROLAC TROMETHAMINE 30 MG/ML
INJECTION, SOLUTION INTRAMUSCULAR; INTRAVENOUS AS NEEDED
Status: DISCONTINUED | OUTPATIENT
Start: 2024-06-09 | End: 2024-06-09

## 2024-06-09 RX ORDER — POLYETHYLENE GLYCOL 3350 17 G/17G
17 POWDER, FOR SOLUTION ORAL 2 TIMES DAILY PRN
Status: DISCONTINUED | OUTPATIENT
Start: 2024-06-09 | End: 2024-06-11 | Stop reason: HOSPADM

## 2024-06-09 RX ORDER — NIFEDIPINE 10 MG/1
10 CAPSULE ORAL ONCE AS NEEDED
Status: DISCONTINUED | OUTPATIENT
Start: 2024-06-09 | End: 2024-06-09 | Stop reason: SDUPTHER

## 2024-06-09 RX ORDER — CEFAZOLIN SODIUM 2 G/100ML
2 INJECTION, SOLUTION INTRAVENOUS ONCE
Status: COMPLETED | OUTPATIENT
Start: 2024-06-09 | End: 2024-06-09

## 2024-06-09 RX ORDER — LABETALOL HYDROCHLORIDE 5 MG/ML
20 INJECTION, SOLUTION INTRAVENOUS ONCE AS NEEDED
Status: DISCONTINUED | OUTPATIENT
Start: 2024-06-09 | End: 2024-06-11 | Stop reason: HOSPADM

## 2024-06-09 RX ORDER — ACETAMINOPHEN 120 MG/1
SUPPOSITORY RECTAL AS NEEDED
Status: DISCONTINUED | OUTPATIENT
Start: 2024-06-09 | End: 2024-06-09

## 2024-06-09 RX ORDER — PHENYLEPHRINE 10 MG/250 ML(40 MCG/ML)IN 0.9 % SOD.CHLORIDE INTRAVENOUS
CONTINUOUS PRN
Status: DISCONTINUED | OUTPATIENT
Start: 2024-06-09 | End: 2024-06-09

## 2024-06-09 RX ORDER — PHENYLEPHRINE HCL IN 0.9% NACL 1 MG/10 ML
SYRINGE (ML) INTRAVENOUS AS NEEDED
Status: DISCONTINUED | OUTPATIENT
Start: 2024-06-09 | End: 2024-06-09

## 2024-06-09 RX ORDER — CEFAZOLIN SODIUM 2 G/100ML
2 INJECTION, SOLUTION INTRAVENOUS ONCE
Status: DISCONTINUED | OUTPATIENT
Start: 2024-06-09 | End: 2024-06-11 | Stop reason: HOSPADM

## 2024-06-09 RX ORDER — MORPHINE SULFATE 1 MG/ML
INJECTION, SOLUTION EPIDURAL; INTRATHECAL; INTRAVENOUS AS NEEDED
Status: DISCONTINUED | OUTPATIENT
Start: 2024-06-09 | End: 2024-06-09

## 2024-06-09 RX ORDER — FAMOTIDINE 10 MG/ML
INJECTION INTRAVENOUS AS NEEDED
Status: DISCONTINUED | OUTPATIENT
Start: 2024-06-09 | End: 2024-06-09

## 2024-06-09 RX ORDER — SIMETHICONE 80 MG
80 TABLET,CHEWABLE ORAL 4 TIMES DAILY PRN
Status: DISCONTINUED | OUTPATIENT
Start: 2024-06-09 | End: 2024-06-11 | Stop reason: HOSPADM

## 2024-06-09 RX ORDER — CARBOPROST TROMETHAMINE 250 UG/ML
250 INJECTION, SOLUTION INTRAMUSCULAR ONCE AS NEEDED
Status: DISCONTINUED | OUTPATIENT
Start: 2024-06-09 | End: 2024-06-11 | Stop reason: HOSPADM

## 2024-06-09 RX ORDER — METHYLERGONOVINE MALEATE 0.2 MG/ML
0.2 INJECTION INTRAVENOUS ONCE AS NEEDED
Status: DISCONTINUED | OUTPATIENT
Start: 2024-06-09 | End: 2024-06-11 | Stop reason: HOSPADM

## 2024-06-09 RX ORDER — ONDANSETRON 4 MG/1
4 TABLET, FILM COATED ORAL EVERY 6 HOURS PRN
Status: DISCONTINUED | OUTPATIENT
Start: 2024-06-09 | End: 2024-06-09 | Stop reason: SDUPTHER

## 2024-06-09 RX ORDER — MISOPROSTOL 200 UG/1
800 TABLET ORAL ONCE AS NEEDED
Status: DISCONTINUED | OUTPATIENT
Start: 2024-06-09 | End: 2024-06-11 | Stop reason: HOSPADM

## 2024-06-09 RX ORDER — ADHESIVE BANDAGE
10 BANDAGE TOPICAL
Status: DISCONTINUED | OUTPATIENT
Start: 2024-06-09 | End: 2024-06-11 | Stop reason: HOSPADM

## 2024-06-09 RX ORDER — LOPERAMIDE HYDROCHLORIDE 2 MG/1
4 CAPSULE ORAL EVERY 2 HOUR PRN
Status: DISCONTINUED | OUTPATIENT
Start: 2024-06-09 | End: 2024-06-11 | Stop reason: HOSPADM

## 2024-06-09 RX ORDER — ENOXAPARIN SODIUM 100 MG/ML
40 INJECTION SUBCUTANEOUS EVERY 24 HOURS
Status: DISCONTINUED | OUTPATIENT
Start: 2024-06-09 | End: 2024-06-11 | Stop reason: HOSPADM

## 2024-06-09 RX ORDER — OXYCODONE HYDROCHLORIDE 10 MG/1
10 TABLET ORAL EVERY 4 HOURS PRN
Status: DISCONTINUED | OUTPATIENT
Start: 2024-06-10 | End: 2024-06-11 | Stop reason: HOSPADM

## 2024-06-09 RX ORDER — DIPHENHYDRAMINE HYDROCHLORIDE 50 MG/ML
25 INJECTION INTRAMUSCULAR; INTRAVENOUS EVERY 4 HOURS PRN
Status: DISCONTINUED | OUTPATIENT
Start: 2024-06-09 | End: 2024-06-11 | Stop reason: HOSPADM

## 2024-06-09 RX ORDER — NALOXONE HYDROCHLORIDE 0.4 MG/ML
0.1 INJECTION, SOLUTION INTRAMUSCULAR; INTRAVENOUS; SUBCUTANEOUS EVERY 5 MIN PRN
Status: DISCONTINUED | OUTPATIENT
Start: 2024-06-09 | End: 2024-06-11 | Stop reason: HOSPADM

## 2024-06-09 RX ORDER — METOCLOPRAMIDE HYDROCHLORIDE 5 MG/ML
INJECTION INTRAMUSCULAR; INTRAVENOUS AS NEEDED
Status: DISCONTINUED | OUTPATIENT
Start: 2024-06-09 | End: 2024-06-09

## 2024-06-09 RX ORDER — SODIUM CITRATE AND CITRIC ACID MONOHYDRATE 334; 500 MG/5ML; MG/5ML
SOLUTION ORAL AS NEEDED
Status: DISCONTINUED | OUTPATIENT
Start: 2024-06-09 | End: 2024-06-09

## 2024-06-09 RX ORDER — LIDOCAINE 560 MG/1
1 PATCH PERCUTANEOUS; TOPICAL; TRANSDERMAL
Status: DISCONTINUED | OUTPATIENT
Start: 2024-06-09 | End: 2024-06-11 | Stop reason: HOSPADM

## 2024-06-09 RX ORDER — OXYTOCIN/0.9 % SODIUM CHLORIDE 30/500 ML
60 PLASTIC BAG, INJECTION (ML) INTRAVENOUS ONCE AS NEEDED
Status: DISCONTINUED | OUTPATIENT
Start: 2024-06-09 | End: 2024-06-11 | Stop reason: HOSPADM

## 2024-06-09 RX ORDER — ONDANSETRON HYDROCHLORIDE 2 MG/ML
4 INJECTION, SOLUTION INTRAVENOUS EVERY 6 HOURS PRN
Status: DISCONTINUED | OUTPATIENT
Start: 2024-06-09 | End: 2024-06-11 | Stop reason: HOSPADM

## 2024-06-09 RX ORDER — OXYTOCIN 10 [USP'U]/ML
10 INJECTION, SOLUTION INTRAMUSCULAR; INTRAVENOUS ONCE AS NEEDED
Status: DISCONTINUED | OUTPATIENT
Start: 2024-06-09 | End: 2024-06-11 | Stop reason: HOSPADM

## 2024-06-09 RX ORDER — DIPHENHYDRAMINE HCL 25 MG
25 TABLET ORAL EVERY 4 HOURS PRN
Status: DISCONTINUED | OUTPATIENT
Start: 2024-06-09 | End: 2024-06-11 | Stop reason: HOSPADM

## 2024-06-09 RX ORDER — ACETAMINOPHEN 325 MG/1
975 TABLET ORAL EVERY 6 HOURS
Status: DISCONTINUED | OUTPATIENT
Start: 2024-06-09 | End: 2024-06-11 | Stop reason: HOSPADM

## 2024-06-09 RX ORDER — TRANEXAMIC ACID 100 MG/ML
1000 INJECTION, SOLUTION INTRAVENOUS ONCE AS NEEDED
Status: DISCONTINUED | OUTPATIENT
Start: 2024-06-09 | End: 2024-06-11 | Stop reason: HOSPADM

## 2024-06-09 RX ORDER — HYDRALAZINE HYDROCHLORIDE 20 MG/ML
5 INJECTION INTRAMUSCULAR; INTRAVENOUS ONCE AS NEEDED
Status: DISCONTINUED | OUTPATIENT
Start: 2024-06-09 | End: 2024-06-11 | Stop reason: HOSPADM

## 2024-06-09 RX ORDER — BISACODYL 10 MG/1
10 SUPPOSITORY RECTAL DAILY PRN
Status: DISCONTINUED | OUTPATIENT
Start: 2024-06-09 | End: 2024-06-11 | Stop reason: HOSPADM

## 2024-06-09 RX ORDER — IBUPROFEN 600 MG/1
600 TABLET ORAL EVERY 6 HOURS
Status: DISCONTINUED | OUTPATIENT
Start: 2024-06-10 | End: 2024-06-11 | Stop reason: HOSPADM

## 2024-06-09 RX ORDER — FENTANYL CITRATE 50 UG/ML
INJECTION, SOLUTION INTRAMUSCULAR; INTRAVENOUS AS NEEDED
Status: DISCONTINUED | OUTPATIENT
Start: 2024-06-09 | End: 2024-06-09

## 2024-06-09 RX ORDER — BUPIVACAINE HYDROCHLORIDE 7.5 MG/ML
INJECTION, SOLUTION INTRASPINAL AS NEEDED
Status: DISCONTINUED | OUTPATIENT
Start: 2024-06-09 | End: 2024-06-09

## 2024-06-09 RX ORDER — ONDANSETRON HYDROCHLORIDE 2 MG/ML
4 INJECTION, SOLUTION INTRAVENOUS EVERY 6 HOURS PRN
Status: DISCONTINUED | OUTPATIENT
Start: 2024-06-09 | End: 2024-06-09 | Stop reason: SDUPTHER

## 2024-06-09 RX ORDER — OXYCODONE HYDROCHLORIDE 5 MG/1
5 TABLET ORAL EVERY 4 HOURS PRN
Status: DISCONTINUED | OUTPATIENT
Start: 2024-06-10 | End: 2024-06-11 | Stop reason: HOSPADM

## 2024-06-09 RX ADMIN — Medication 100 MCG: at 07:00

## 2024-06-09 RX ADMIN — ACETAMINOPHEN 650 MG: 120 SUPPOSITORY RECTAL at 07:28

## 2024-06-09 RX ADMIN — KETOROLAC TROMETHAMINE 30 MG: 30 INJECTION, SOLUTION INTRAMUSCULAR; INTRAVENOUS at 20:18

## 2024-06-09 RX ADMIN — Medication 100 MCG: at 07:40

## 2024-06-09 RX ADMIN — FAMOTIDINE 20 MG: 10 INJECTION, SOLUTION INTRAVENOUS at 05:44

## 2024-06-09 RX ADMIN — ONDANSETRON 4 MG: 2 INJECTION, SOLUTION INTRAMUSCULAR; INTRAVENOUS at 06:26

## 2024-06-09 RX ADMIN — DEXAMETHASONE SODIUM PHOSPHATE 4 MG: 4 INJECTION, SOLUTION INTRAMUSCULAR; INTRAVENOUS at 06:26

## 2024-06-09 RX ADMIN — METOCLOPRAMIDE 10 MG: 5 INJECTION, SOLUTION INTRAMUSCULAR; INTRAVENOUS at 05:44

## 2024-06-09 RX ADMIN — AZITHROMYCIN 500 MG: 500 INJECTION, POWDER, LYOPHILIZED, FOR SOLUTION INTRAVENOUS at 06:00

## 2024-06-09 RX ADMIN — BUPIVACAINE HYDROCHLORIDE IN DEXTROSE 1.6 ML: 7.5 INJECTION, SOLUTION SUBARACHNOID at 05:51

## 2024-06-09 RX ADMIN — KETOROLAC TROMETHAMINE 30 MG: 30 INJECTION, SOLUTION INTRAMUSCULAR; INTRAVENOUS at 13:53

## 2024-06-09 RX ADMIN — Medication 100 MCG: at 07:24

## 2024-06-09 RX ADMIN — ACETAMINOPHEN 975 MG: 325 TABLET ORAL at 13:53

## 2024-06-09 RX ADMIN — SODIUM CHLORIDE, SODIUM LACTATE, POTASSIUM CHLORIDE, AND CALCIUM CHLORIDE: 600; 310; 30; 20 INJECTION, SOLUTION INTRAVENOUS at 07:26

## 2024-06-09 RX ADMIN — SODIUM CHLORIDE, SODIUM LACTATE, POTASSIUM CHLORIDE, AND CALCIUM CHLORIDE: 600; 310; 30; 20 INJECTION, SOLUTION INTRAVENOUS at 05:44

## 2024-06-09 RX ADMIN — Medication 100 MCG: at 06:38

## 2024-06-09 RX ADMIN — LIDOCAINE 4% 1 PATCH: 40 PATCH TOPICAL at 10:04

## 2024-06-09 RX ADMIN — FENTANYL CITRATE 15 MCG: 50 INJECTION, SOLUTION INTRAMUSCULAR; INTRAVENOUS at 05:51

## 2024-06-09 RX ADMIN — ENOXAPARIN SODIUM 40 MG: 40 INJECTION SUBCUTANEOUS at 20:18

## 2024-06-09 RX ADMIN — CEFAZOLIN SODIUM 2 G: 2 INJECTION, SOLUTION INTRAVENOUS at 05:53

## 2024-06-09 RX ADMIN — MORPHINE SULFATE 0.15 MG: 1 INJECTION, SOLUTION EPIDURAL; INTRATHECAL; INTRAVENOUS at 05:51

## 2024-06-09 RX ADMIN — Medication 0.59 MCG/KG/MIN: at 05:53

## 2024-06-09 RX ADMIN — ACETAMINOPHEN 975 MG: 325 TABLET ORAL at 20:18

## 2024-06-09 RX ADMIN — OXYTOCIN 600 MILLI-UNITS/MIN: 10 INJECTION, SOLUTION INTRAMUSCULAR; INTRAVENOUS at 06:26

## 2024-06-09 RX ADMIN — SODIUM CITRATE AND CITRIC ACID MONOHYDRATE 30 ML: 500; 334 SOLUTION ORAL at 05:44

## 2024-06-09 RX ADMIN — SCOPOLAMINE 1 PATCH: 1.5 PATCH, EXTENDED RELEASE TRANSDERMAL at 06:26

## 2024-06-09 RX ADMIN — KETOROLAC TROMETHAMINE 30 MG: 30 INJECTION, SOLUTION INTRAMUSCULAR; INTRAVENOUS at 07:10

## 2024-06-09 SDOH — HEALTH STABILITY: MENTAL HEALTH: HAVE YOU USED ANY PRESCRIPTION DRUGS OTHER THAN PRESCRIBED IN THE PAST 12 MONTHS?: NO

## 2024-06-09 SDOH — SOCIAL STABILITY: SOCIAL INSECURITY: HAVE YOU HAD THOUGHTS OF HARMING ANYONE ELSE?: NO

## 2024-06-09 SDOH — HEALTH STABILITY: MENTAL HEALTH: WISH TO BE DEAD (PAST 1 MONTH): NO

## 2024-06-09 SDOH — SOCIAL STABILITY: SOCIAL INSECURITY: ABUSE SCREEN: ADULT

## 2024-06-09 SDOH — SOCIAL STABILITY: SOCIAL INSECURITY: HAS ANYONE EVER THREATENED TO HURT YOUR FAMILY OR YOUR PETS?: NO

## 2024-06-09 SDOH — SOCIAL STABILITY: SOCIAL INSECURITY: DO YOU FEEL ANYONE HAS EXPLOITED OR TAKEN ADVANTAGE OF YOU FINANCIALLY OR OF YOUR PERSONAL PROPERTY?: NO

## 2024-06-09 SDOH — SOCIAL STABILITY: SOCIAL INSECURITY: VERBAL ABUSE: DENIES

## 2024-06-09 SDOH — SOCIAL STABILITY: SOCIAL INSECURITY: ARE THERE ANY APPARENT SIGNS OF INJURIES/BEHAVIORS THAT COULD BE RELATED TO ABUSE/NEGLECT?: NO

## 2024-06-09 SDOH — SOCIAL STABILITY: SOCIAL INSECURITY: HAVE YOU HAD ANY THOUGHTS OF HARMING ANYONE ELSE?: NO

## 2024-06-09 SDOH — HEALTH STABILITY: MENTAL HEALTH: HAVE YOU USED ANY SUBSTANCES (CANABIS, COCAINE, HEROIN, HALLUCINOGENS, INHALANTS, ETC.) IN THE PAST 12 MONTHS?: NO

## 2024-06-09 SDOH — HEALTH STABILITY: MENTAL HEALTH: NON-SPECIFIC ACTIVE SUICIDAL THOUGHTS (PAST 1 MONTH): NO

## 2024-06-09 SDOH — HEALTH STABILITY: MENTAL HEALTH: SUICIDAL BEHAVIOR (LIFETIME): NO

## 2024-06-09 SDOH — HEALTH STABILITY: MENTAL HEALTH: CURRENT SMOKER: 0

## 2024-06-09 SDOH — SOCIAL STABILITY: SOCIAL INSECURITY: PHYSICAL ABUSE: DENIES

## 2024-06-09 SDOH — SOCIAL STABILITY: SOCIAL INSECURITY: ARE YOU OR HAVE YOU BEEN THREATENED OR ABUSED PHYSICALLY, EMOTIONALLY, OR SEXUALLY BY ANYONE?: NO

## 2024-06-09 SDOH — ECONOMIC STABILITY: HOUSING INSECURITY: DO YOU FEEL UNSAFE GOING BACK TO THE PLACE WHERE YOU ARE LIVING?: NO

## 2024-06-09 SDOH — SOCIAL STABILITY: SOCIAL INSECURITY: DOES ANYONE TRY TO KEEP YOU FROM HAVING/CONTACTING OTHER FRIENDS OR DOING THINGS OUTSIDE YOUR HOME?: NO

## 2024-06-09 ASSESSMENT — ACTIVITIES OF DAILY LIVING (ADL): LACK_OF_TRANSPORTATION: NO

## 2024-06-09 ASSESSMENT — PAIN SCALES - GENERAL
PAINLEVEL_OUTOF10: 0 - NO PAIN
PAINLEVEL_OUTOF10: 2
PAINLEVEL_OUTOF10: 0 - NO PAIN
PAIN_LEVEL: 3

## 2024-06-09 ASSESSMENT — LIFESTYLE VARIABLES
SKIP TO QUESTIONS 9-10: 1
AUDIT-C TOTAL SCORE: 0
AUDIT-C TOTAL SCORE: 0
HOW OFTEN DO YOU HAVE 6 OR MORE DRINKS ON ONE OCCASION: NEVER
HOW MANY STANDARD DRINKS CONTAINING ALCOHOL DO YOU HAVE ON A TYPICAL DAY: PATIENT DOES NOT DRINK
HOW OFTEN DO YOU HAVE A DRINK CONTAINING ALCOHOL: NEVER

## 2024-06-09 ASSESSMENT — PATIENT HEALTH QUESTIONNAIRE - PHQ9
2. FEELING DOWN, DEPRESSED OR HOPELESS: NOT AT ALL
1. LITTLE INTEREST OR PLEASURE IN DOING THINGS: NOT AT ALL
SUM OF ALL RESPONSES TO PHQ9 QUESTIONS 1 & 2: 0

## 2024-06-09 ASSESSMENT — PAIN DESCRIPTION - DESCRIPTORS: DESCRIPTORS: SORE

## 2024-06-09 NOTE — H&P
Obstetrical Admission History and Physical     Marty Prasad is a 29 y.o.  at 38w5d. JAIME: 2024, by Last Menstrual Period. She has had prenatal care with Dr Shook .    Chief Complaint: Contractions and Leakage/Loss of Fluid    Assessment/Plan    Proceed with unscheduled c/s - discussed low risk of success on  calculator however slightly higher now with early  labor, patient still deisres to proceed with section, azithro and cefazolin ordered, consented previously and verbal consent now, all questions answered      Principal Problem:    Uterine scar from previous  delivery  Active Problems:    Previous  section complicating pregnancy ([facility])    Full-term PROM with onset of labor within 24 hours of rupture ([facility])      Pregnancy Problems (from 23 to present)       Problem Noted Resolved    Full-term PROM with onset of labor within 24 hours of rupture ([facility]) 2024 by Hayley Persaud MD No    Priority:  Medium      Prenatal care, antepartum ([facility]) 2024 by Alice Shook MD No    Priority:  Medium      Overview Addendum 2024  9:08 AM by Alice Shook MD     -Last pap (2021): NIL/HRHPV other positive, for repeat postpartum  -s/p COVID vaccine  -Risk-reducing cfDNA  -s/p tdap  -Breast/POPs bridge to vasectomy  rCS scheduled for  at 8:30 AM with [place], surgical consent signed                Options for delivery have been discussed with the patient and she elects a vaginal delivery.  Labor has been discussed in detail. The risks, benefits, complications, alternatives, expected outcomes, potential problems during recuperation and recovery, and the risks of not performing the procedure were discussed with the patient. The patient stated understanding that the risks of delivery include, but are not limited to: death; reaction to medications; injury to bowel, bladder, ureters, uterus, cervix, vagina, and other pelvic and abdominal structures,  infection; blood loss and possible need for transfusion; and potential need for surgery, including hysterectomy. The risks of injury to the infant during delivery were also discussed. All questions were answered. The patient is wishing to continue with plans for a vaginal delivery.    Admit to inpatient status. I anticipate that this patient will require a stay exceeding at least 2 midnights for delivery and postpartum.  Active management of rupture of membranes.  Management of pregnancy complications, as indicated.    Jaye Ferguson presents to Labor & Delivery with Spontaneous Rupture of Membranes of  clear  fluid at   0200hr on 24 . Good fetal movement. Denies vaginal bleeding., Having contractions q 5 minutes starting shortly after leakage began.          Obstetrical History   OB History    Para Term  AB Living   3 1 1 0 1 1   SAB IAB Ectopic Multiple Live Births   1 0 0 0 1      # Outcome Date GA Lbr Miguel/2nd Weight Sex Delivery Anes PTL Lv   3 Current            2 Term 01/07/15 39w0d  3.459 kg F CS-Unspec Spinal N WOJCIECH      Complications: Shoulder Dystocia, Failure to Progress in Second Stage   1 SAB  5w0d    Complete          Past Medical History  History reviewed. No pertinent past medical history.     Past Surgical History   Past Surgical History:   Procedure Laterality Date     SECTION, CLASSIC         Social History  Social History     Tobacco Use    Smoking status: Never    Smokeless tobacco: Never   Substance Use Topics    Alcohol use: Never     Substance and Sexual Activity   Drug Use Never       Allergies  Patient has no known allergies.     Medications  Medications Prior to Admission   Medication Sig Dispense Refill Last Dose    breast pump device Double electric breast pump 1 each 0     Prenatal Vitamin Plus Low Iron 27 mg iron- 1 mg tablet Take 1 tablet by mouth once daily.          Objective    Last Vitals  Temp Pulse Resp BP MAP O2 Sat   36.5 °C (97.7 °F) 85 18 124/75    99 %     Physical Examination  GENERAL: Examination reveals a well developed, well nourished, gravid female  breathing through contractions, resting between . She is alert and cooperative.  FHR is  130, with moderate variability , and accels, no decels, a  category 1  tracing.    Pinch reading:  q2-3m  The fetus is in a vertex presentation, determined by ultrasound  CERVIX:  3/100/-3 ; MEMBRANES are streaming on speculum exam, palpable bag at cervix      Lab Review  Labs in chart were reviewed.  Obstetrical Admission History and Physical     Marty Prasad is a 29 y.o.  at 38w5d by LMP c/w 9 week scan presenting for contractions and spotting    Chief Complaint: Contractions and Leakage/Loss of Fluid    Assessment/Plan    Contractions/Spotting  -Prior LTCS x1 with plan for repeat    -SVE 1.5/80/-3 -> unchanged on 2 hour recheck  -Is having contractions on TOCO but irregular and not painful per patient  -SSE with brown mucus, no bright red blood or active bleeding  -Reactive NST      Hayley Persaud MD     Principal Problem:    Uterine scar from previous  delivery  Active Problems:    Previous  section complicating pregnancy ([facility])    Full-term PROM with onset of labor within 24 hours of rupture ([facility])      Pregnancy Problems (from 23 to present)       Problem Noted Resolved    Prenatal care, antepartum ([facility]) 2024 by Alice Shook MD No    Priority:  Medium      Overview Addendum 2024  9:08 AM by Alice Shook MD     -Last pap (2021): NIL/HRHPV other positive, for repeat postpartum  -s/p COVID vaccine  -Risk-reducing cfDNA  -s/p tdap  -Breast/POPs bridge to vasectomy  rCS scheduled for  at 8:30 AM with mari Shook consent signed                Subjective   Marty is here complaining of contractions and spotting. Has been having irregular contractions for days, unable to quantify them but not too painful. Started having light pink mucus  spotting with wiping this AM. No pain in between contractions. Good FM, no LOF. Last ate cereal at 1000 this AM.     Pregnancy notable for:  -Prior  for arrest of active phase with desire for repeat     Obstetrical History   OB History    Para Term  AB Living   3 1 1 0 1 1   SAB IAB Ectopic Multiple Live Births   1 0 0 0 1      # Outcome Date GA Lbr Miguel/2nd Weight Sex Delivery Anes PTL Lv   3 Current            2 Term 01/07/15 39w0d  3.459 kg F CS-Unspec Spinal N WOJCIECH      Complications: Shoulder Dystocia, Failure to Progress in Second Stage   1 SAB  5w0d    Complete          Past Medical History  History reviewed. No pertinent past medical history.     Past Surgical History   Past Surgical History:   Procedure Laterality Date     SECTION, CLASSIC       Social History  Social History     Tobacco Use    Smoking status: Never    Smokeless tobacco: Never   Substance Use Topics    Alcohol use: Never     Substance and Sexual Activity   Drug Use Never       Allergies  Patient has no known allergies.     Medications  Medications Prior to Admission   Medication Sig Dispense Refill Last Dose    breast pump device Double electric breast pump 1 each 0     Prenatal Vitamin Plus Low Iron 27 mg iron- 1 mg tablet Take 1 tablet by mouth once daily.          Objective    Last Vitals  Temp Pulse Resp BP MAP O2 Sat   36.5 °C (97.7 °F) 85 18 124/75   99 %     Physical Examination  Gen: awake, alert  Head: NCAT  HEENT: moist mucus membranes  Pulm: breathing comfortably on room air  CV: warm and well-perfused  Abd: gravid, non-tender  Neuro: alert and oriented  Psych: appropriate affect     FHT: 140, moderate variability, + accels, -decels   TOCO: ctx q5 min  SVE: 1.5/80/-3 -> unchanged on two hour recheck  SSE: brown mucus in vault, no bright red blood or bleeding with valsalva  BSUS: cephalic    Lab Review  Labs in chart were reviewed.

## 2024-06-09 NOTE — LACTATION NOTE
Lactation Consultant Note  Lactation Consultation  Reason for Consult: Initial assessment  Consultant Name: Katerin Ventura    Maternal Information  Has mother  before?: Yes  How long did the mother previously breastfeed?: Older child age 8.5 years,  for 2 years  Previous Maternal Breastfeeding Challenges: Difficult latch  Infant to breast within first 2 hours of birth?: Yes  Exclusive Pump and Bottle Feed: No    Maternal Assessment  Breast Assessment: Medium, Symmetrical, Compressible  Nipple Assessment: Intact, Erect  Areola Assessment: Normal    Infant Assessment  Infant Behavior: Sleepy, Rooting response, Sucking  Infant Assessment: Tongue protrudes over alveolar ridge    Feeding Assessment  Nutrition Source: Breastmilk  Feeding Method: Nursing at the breast, Feeding expressed breastmilk, Spoon feeding  Feeding Position: Cradle, Breast sandwich, Mother demonstrates good positioning, Mother needs assistance with latch/positioning  Suck/Feeding: Disorganized suck, Sustained, Clenching/biting, Baby led rhythmically, Audible swallowing  Latch Assessment: Minimal assistance is needed, Instructed on deep latch, Latch achieved after repeated attempts, Wide open mouth < 160, Bursts of sucking, swallowing, and rest    LATCH TOOL  Latch: Repeated attempts, hold nipple in mouth, stimulate to suck  Audible Swallowing: Spontaneous and intermittent (24 hours old)  Type of Nipple: Everted (After stimulation)  Comfort (Breast/Nipple): Soft/non-tender  Hold (Positioning): Minimal assist, teach one side, mother does other, staff holds  LATCH Score: 8    Breast Pump  Pump: Hand expression  Frequency: Other (comment) (As needed for poor feeding)  Volume of Milk Production: 1.5  Units of Volume: mL per session    Other OB Lactation Tools       Patient Follow-up  Inpatient Lactation Follow-up Needed : Yes  Outpatient Lactation Follow-up: Recommended    Other OB Lactation Documentation  Maternal Risk Factors:   delivery  Infant Risk Factors: Early term birth 37-39 weeks    Recommendations/Summary  , 38.5 weeks, Rc/s on @0625. Birthweight 3290g. Experienced,  older child (age 9 years) for 2 years. Mother reports infant latched well in recovery, then sleepy and disinterested in feeding. LC at bedside to assist with gentle waking. Infant rouses easily, biting/clenching on gloved finger, some gagging. Attempted to latch for 10min, infant not interested in breast. Mother demonstrated hand expression correctly, easily expressed 1.5mL. LC spoon fed to infant, infant content.    Second consult, 1400: Called to bedside, parents reported infant jittery, RN checked temp and BG, WNL. Infant showing hunger cues and rooting. Taught ABC's of good positioning: arms around breast, infant belly to belly with parent, infant's curve of hip to parent's curve of body. Taught to have infant rest their chin on the breast below the areola. Parents instructed to wait for gape reflex elicited by chin pressure on the breast, before hugging infant close to facilitate latching. Parents demonstrate technique without assistance from IBCLC to time latching. Infant able to latch deeply with wide gape and sustained rhythmical sucking. Mother reports latch comfortable.    Plan:  Cue based feeding, at least 8-12 times in 24 hours  Frequent skin to skin  Hand express for extra volume  Call for assistance as needed    Educated parents on skin to skin, hand expression, hunger cues and feeding frequency/patterns. Discussed expected output, weight loss <10%, and normal bilirubin. Educated on AAP pacifier recommendations. Reviewed outpatient resources.    Follow up, 1715: JESSICA rounding, infant latched at the breast. Demonstrated to mother how to flange out lips for deeper latch. Infant has wide gape, flanged lips, bursts of sucking, swallowing, and rest. Mother reports latch comfortable, infant waking to feed every 1.5 hours. Encouraged to call with  any questions or concerns.

## 2024-06-09 NOTE — NURSING NOTE
0910 Scopalamine patch removed at this time as discussed with anesthesia due to pt light headedness.     1012 CRNA at bedside assessing patient due lightheadedness that she reports has come in waves since . 500cc bolus started at this time per CRNA Sabina d/t pt still experiencing light headedness. Dr. Juarez aware.     1035Patient reporting improvement in lightheadedness but not yet resolved. Dr. Juarez to see patient at bedside before transfer to postpartum.    1048 Per Dr. Juarez okay to transfer to postpartum. Kurtz to be removed after 1400.

## 2024-06-09 NOTE — ANESTHESIA PREPROCEDURE EVALUATION
Patient: Marty Prasad    Evaluation Method: In-person visit    Procedure Information       Date: 24    Procedure: Repeat , postpartum care -  at 8:30 AM with on call provider ([place])    [facility]: [facility]    Surgeons: Alice Shook MD          29 year old  38w5d here for repeat Csection in the setting of active labor.    Patient denies personal or family h/o problems with GA.    Relevant Problems   No relevant active problems       Clinical information reviewed:   Tobacco  Allergies  Meds  Problems  Med Hx  Surg Hx   Fam Hx  Soc   Hx        NPO Detail:  NPO/Void Status  Date of Last Solid: 24  Time of Last Solid:   Last Intake Type: Food         OB/Gyn Evaluation    Present Pregnancy    Patient is pregnant now.  (+) , previous  section (emergency, GA) - primary   Obstetric History                Physical Exam    Airway  Mallampati: II  TM distance: >3 FB  Neck ROM: full     Cardiovascular - normal exam  Rhythm: regular  Rate: normal     Dental - normal exam     Pulmonary - normal exam  Breath sounds clear to auscultation     Abdominal - normal exam    Comments: gravid           Anesthesia Plan    History of general anesthesia?: yes  History of complications of general anesthesia?: no    ASA 2     spinal     The patient is not a current smoker.    Postoperative administration of opioids is intended.  Anesthetic plan and risks discussed with patient.  Use of blood products discussed with patient who consented to blood products.      I informed and discussed the risks and benefits of general and spinal anesthesia with the patient.  The patient expressed her understanding and her questions were answered.  A verbal consent was given by the patient.

## 2024-06-09 NOTE — ANESTHESIA PROCEDURE NOTES
Spinal Block    Patient location during procedure: OB  Start time: 6/9/2024 5:48 AM  End time: 6/9/2024 5:51 AM  Reason for block: primary anesthetic  Staffing  Performed: CRNA   Authorized by: DIAMOND Hart    Performed by: DIAMOND Hart    Preanesthetic Checklist  Completed: patient identified, IV checked, risks and benefits discussed, surgical consent, monitors and equipment checked, pre-op evaluation, timeout performed and sterile techniques followed  Block Timeout  RN/Licensed healthcare professional reads aloud to the Anesthesia provider and entire team: Patient identity, procedure with side and site, patient position, and as applicable the availability of implants/special equipment/special requirements.  Patient on coagulant treatment: no  Timeout performed at: 6/9/2024 5:48 AM  Spinal Block  Patient position: sitting  Prep: ChloraPrep  Sterility prep: cap, drape, gloves, hand hygiene and mask  Sedation level: no sedation  Patient monitoring: blood pressure and continuous pulse oximetry  Approach: midline  Vertebral space: L3-4  Injection technique: single-shot  Needle  Needle type: pencil-point   Needle gauge: 25 G  Needle length: 8.9cm.  Free flowing CSF: yes    Assessment  Sensory level: T4 bilateral  Block outcome: Allis test negative  Events: No adverse events; negative pain/paresthesias on injection  Procedure assessment: patient tolerated procedure well with no immediate complications

## 2024-06-09 NOTE — L&D DELIVERY NOTE
OB Delivery Note  2024  Marty Prasad  29 y.o.   , Low Transverse        Gestational Age: 38w5d  /Para:   Quantitative Blood Loss: Admission to Discharge: 1085 mL (2024  3:52 AM - 2024 12:05 PM)    Rosalia Prasad [[id number]]      Labor Events    Sac identifier: Sac 1  Rupture date/time: 2024 0200  Rupture type: Spontaneous  Fluid color: Meconium  Fluid odor: None  Labor type: Spontaneous Onset of Labor  Labor allowed to proceed with plans for an attempted vaginal birth?: No  Augmentation: None  Complications: None       Labor Length    3rd stage: 0h 03m       Placenta    Placenta delivery date/time: 2024  Placenta removal: Spontaneous  Placenta appearance: Intact  Placenta disposition: discarded       Cord    Vessels: 3 vessels  Complications: None  Delayed cord clamping?: No  Cord clamped date/time: 2024  Cord blood disposition: Lab  Gases sent?: Yes       Lacerations    Episiotomy: None  Perineal laceration: None  Other lacerations?: No  Repair suture: None       Anesthesia    Method: Spinal       Operative Delivery    Forceps attempted?: No  Vacuum extractor attempted?: No       Shoulder Dystocia    Shoulder dystocia present?: No       [place] Delivery    Time head delivered: 2024 06:25:00  Birth date/time: 2024 06:25:00  Delivery type: , Low Transverse   categorization: repeat   priority: routine  Indications for : Repeat   Complications: None       Resuscitation    Method: Suctioning, Tactile stimulation       Apgars    Living status: Living  Apgar Component Scores:  1 min.:  5 min.:  10 min.:  15 min.:  20 min.:    Skin color:  0  1       Heart rate:  2  2       Reflex irritability:  2  2       Muscle tone:  2  2       Respiratory effort:  2  2       Total:  8  9       Apgars assigned by: VIRIDIANA ROBERTS       Delivery Providers    Delivering clinician: Hayley Persaud MD   Provider Role    Viridiana  Castillo, RN Delivery Nurse    Nan Hernández, RN Nursery Nurse                 Hayley Persaud MD

## 2024-06-09 NOTE — PROGRESS NOTES
At bedside for c/o dizziness  Minimal vag bleeding  Scopalamine patch removed and extra 500 ml bolus IV given    Orthostatic vitals taken w no changes / neg = /62  P 57     Ok for transfer to PP floor.    Tish Juarez MD

## 2024-06-09 NOTE — ANESTHESIA POSTPROCEDURE EVALUATION
Patient: Marty Prasad    Procedure Summary       Date: 24 Room / Location: [facility]    Anesthesia Start: 544 Anesthesia Stop: 757    Procedure: Repeat , postpartum care Diagnosis:       Uterine scar from previous  delivery      (Uterine scar from previous  delivery [N85.A])    Surgeons: Hayley Persaud MD Responsible Provider: DIAMOND Hart    Anesthesia Type: spinal ASA Status: 2            Anesthesia Type: spinal     38w5d 29 y.o.       Vitals Value Taken Time   /70 24 1108   Temp 36.6 °C (97.9 °F) 24 1108   Pulse 68 24 1108   Resp 18 24 1108   SpO2 99 % 24 1108       Anesthesia Post Evaluation    Patient location during evaluation: bedside  Patient participation: complete - patient participated  Level of consciousness: awake and alert  Pain score: 3  Pain management: adequate  Multimodal analgesia pain management approach  Airway patency: patent  Cardiovascular status: acceptable  Respiratory status: acceptable  Hydration status: acceptable  Postoperative Nausea and Vomiting: mild  Comments: Patient feeling slightly dizzy at times, but it is resolving. Dr. Juarez is aware.          No notable events documented.

## 2024-06-09 NOTE — L&D DELIVERY NOTE
Operative Report     Review the Delivery Report for details.     Preoperative Diagnoses:                                       Pre-op Diagnosis     * Uterine scar from previous  delivery [N85.A]    Postoperative Diagnoses:  Post-op Diagnosis     * Uterine scar from previous  delivery [N85.A]     Procedures:  Repeat , postpartum care, N/A      Indications for Surgery:   1. History of prior    2. Latent labor     Surgeon(s):    Primary: Hayley Persaud MD    Assistant(s): Cindy Marquez    Anesthesia: Spinal    Anesthesiologist(s):    CRNA: RENITA Abel-ESTRELLITA; DIAMOND Hart     Blood Loss:    1085cc    Maternal Blood Type: O POS    Complications:    * No complications entered in OR log *     Findings:  Birth Date and Time: 2024 at 6:25 AM   Infant: living 3.29 kg male with Apgar scores of 8 at 1 min / 9 at 5 min  Cord gases: arterial pH 7.24, BE -4.5  Normal adnexa bilaterally    Postoperative Condition: stable.      Sponge, Needle, Instrument Count: were correct.      Specimen(s): No specimens collected     Drains: Kurtz catheter in place.      Informed Consent:  The risks, benefits, complications, alternatives, expected outcomes, potential problems during recuperation and recovery, and the risks of not performing the procedure were discussed with the patient. The patient stated understanding that the risks of a  Section include, but are not limited to: death; reaction to medications; injury to bowel, bladder, ureters, uterus, cervix, vagina, and other pelvic and abdominal structures, infection; blood loss and possible need for transfusion; and potential need for more surgery, including hysterectomy. The risks of injury to the infant during  Section were also discussed. The possible need for a  Section in the future was explained. All questions were answered. There was concurrence with the planned procedure, and the patient wanted  to proceed.     Operative Procedure Details:  The patient was taken to the operating room, identified as Marty Prasad, using two factor identification, and the procedure verified as   Section. A Timeout was held and the above information was confirmed.    With adequate Spinal anesthesia, the patient was placed in a dorsal position with a left lateral tilt, and the abdomen was prepped and draped for routine abdominal surgery. Kurtz catheter has been inserted with return of clear urine. Sequential compression devices were applied to both legs. Antibiotics (Ancef and Azithromycin) were given for infection prophylaxis.    A Pfannenstiel incision was made and was carried through the subcutaneous tissue to the level of the anterior fascia. Hemostasis was obtained with electrocautery.The fascia was incised and extended laterally with Gold scissors. The inferior aspect of the fascia was grasped with Kocher clamps. The underlying rectus and pyramidalis muscles were dissected from the facia with Gold scissors. In a similar fashion, the superior aspect of the fascia was elevated with Kocher clamps, and the rectus muscle was dissected from the fascia. The rectus and pyramidalis muscles were  bluntly, the peritoneum was identified, tented and carefully entered with metzenbaum scissors. It was incised in a vertical manner, to allow exposure to the surgical field. The vesicouterine reflection of the peritoneum was identified and the bladder flap was sharply developed. A bladder blade was inserted to retract the bladder from the surgical field.  The uterus was sharply entered with a low-transverse incision, and extended to provide access to the uterine cavity. Upon entering the amniotic sac, copious and meconium stained fluid was noted. The fetal head was gently brought into the incision and with the bladder blade removed fundal pressure applied. The fetus was found to rotate into a transverse position with fetal  head on the left and fetal back front. The feet were identified, directed to the uterine incision, grasped, and infant was carefully delivered to the hips. The body was delivered to the shoulders, and the arms were carefully reduced. The head was atraumatically delivered through the uterine incision, face was wiped, and spontaneous eye opening and normal tone without spontaneous cry was noted. The cord was doubly clamped and cut, and the infant was passed to the awaiting clinical team.     Product of the pregnancy was a living , 3.29 kg , female infant with Apgar scores of 8 at 1 min / 9 at 5 min. The  had suctioning;tactile stimulation  resuscitation. Skin to Skin was initiated at 6:39 AM . The placenta was removed with manual massage from a anterior position. It appeared intact  with 3 vessels  in the cord. On delivery of the placenta, a continuous infusion of oxytocin IV was started.     The uterus was exteriorized. The uterine cavity was examined with no residual membranes or placenta being noted. The cut edges of the myometrium were examined, and no active bleeding was found. The hysterotomy was reapproximated with a running suture of 0-Monocryl. An additional imbricating suture of 0-Monocryl was placed. Good hemostasis was obtained with additional figure-of-eight sutures with 3-0 Monocryl and judicious use of electrosurgery. The bladder flap was thoroughly irrigated with warm saline, and hemostasis was present.    The adnexal structures of the uterus were examined, and no abnormalities were noted.    The lateral gutters were explored with residual blood and clots removed. The pelvic cavity was thoroughly irrigated with warm saline. After being assured of good hemostasis, and obtaining a correct sponge, needle and instrument count, the peritoneum was identified and left unsutured. The muscular tissue was irrigated, and hemostasis was present. The peritoneum were loosely approximated in the midline with  interrupted sutures of 2-0 Vicryl. The fascia was reapproximated with a running 0 Vicryl suture. The suprafascial tissue was irrigated with warm saline, subcutaneous tissue closed with 1 layers with 2-0 Vicryl, and the skin edges reapproximated with 4-0 Monocryl. A sterile Aquacel dressing was applied to the incisional area, and the procedure was terminated.    Urinary output had been monitored throughout surgery, and at termination, urine was clear and output was fair.     The patient tolerated the operative delivery well, and was taken to recovery in good and stable condition.      Sequential compression devices will be continued on both legs.

## 2024-06-10 LAB
ERYTHROCYTE [DISTWIDTH] IN BLOOD BY AUTOMATED COUNT: 13.4 % (ref 11.5–14.5)
HCT VFR BLD AUTO: 34.8 % (ref 36–46)
HGB BLD-MCNC: 11.1 G/DL (ref 12–16)
MCH RBC QN AUTO: 27.5 PG (ref 26–34)
MCHC RBC AUTO-ENTMCNC: 31.9 G/DL (ref 32–36)
MCV RBC AUTO: 86 FL (ref 80–100)
NRBC BLD-RTO: 0 /100 WBCS (ref 0–0)
PLATELET # BLD AUTO: 195 X10*3/UL (ref 150–450)
RBC # BLD AUTO: 4.04 X10*6/UL (ref 4–5.2)
TREPONEMA PALLIDUM IGG+IGM AB [PRESENCE] IN SERUM OR PLASMA BY IMMUNOASSAY: NONREACTIVE
WBC # BLD AUTO: 16.9 X10*3/UL (ref 4.4–11.3)

## 2024-06-10 PROCEDURE — 85027 COMPLETE CBC AUTOMATED: CPT | Performed by: STUDENT IN AN ORGANIZED HEALTH CARE EDUCATION/TRAINING PROGRAM

## 2024-06-10 PROCEDURE — 2500000005 HC RX 250 GENERAL PHARMACY W/O HCPCS: Performed by: STUDENT IN AN ORGANIZED HEALTH CARE EDUCATION/TRAINING PROGRAM

## 2024-06-10 PROCEDURE — 2500000004 HC RX 250 GENERAL PHARMACY W/ HCPCS (ALT 636 FOR OP/ED): Performed by: STUDENT IN AN ORGANIZED HEALTH CARE EDUCATION/TRAINING PROGRAM

## 2024-06-10 PROCEDURE — 1210000001 HC SEMI-PRIVATE ROOM DAILY

## 2024-06-10 PROCEDURE — 36415 COLL VENOUS BLD VENIPUNCTURE: CPT | Performed by: STUDENT IN AN ORGANIZED HEALTH CARE EDUCATION/TRAINING PROGRAM

## 2024-06-10 PROCEDURE — 2500000001 HC RX 250 WO HCPCS SELF ADMINISTERED DRUGS (ALT 637 FOR MEDICARE OP): Performed by: STUDENT IN AN ORGANIZED HEALTH CARE EDUCATION/TRAINING PROGRAM

## 2024-06-10 RX ADMIN — IBUPROFEN 600 MG: 600 TABLET, FILM COATED ORAL at 08:47

## 2024-06-10 RX ADMIN — ACETAMINOPHEN 975 MG: 325 TABLET ORAL at 20:33

## 2024-06-10 RX ADMIN — ACETAMINOPHEN 975 MG: 325 TABLET ORAL at 08:47

## 2024-06-10 RX ADMIN — ACETAMINOPHEN 975 MG: 325 TABLET ORAL at 01:56

## 2024-06-10 RX ADMIN — IBUPROFEN 600 MG: 600 TABLET, FILM COATED ORAL at 14:34

## 2024-06-10 RX ADMIN — LIDOCAINE 4% 1 PATCH: 40 PATCH TOPICAL at 14:34

## 2024-06-10 RX ADMIN — IBUPROFEN 600 MG: 600 TABLET, FILM COATED ORAL at 20:33

## 2024-06-10 RX ADMIN — PRENATAL VIT W/ FE FUMARATE-FA TAB 27-0.8 MG 1 TABLET: 27-0.8 TAB at 08:47

## 2024-06-10 RX ADMIN — ENOXAPARIN SODIUM 40 MG: 40 INJECTION SUBCUTANEOUS at 20:34

## 2024-06-10 RX ADMIN — KETOROLAC TROMETHAMINE 30 MG: 30 INJECTION, SOLUTION INTRAMUSCULAR; INTRAVENOUS at 01:56

## 2024-06-10 RX ADMIN — SIMETHICONE 80 MG: 80 TABLET, CHEWABLE ORAL at 14:34

## 2024-06-10 RX ADMIN — ACETAMINOPHEN 975 MG: 325 TABLET ORAL at 14:34

## 2024-06-10 ASSESSMENT — PAIN SCALES - GENERAL
PAINLEVEL_OUTOF10: 0 - NO PAIN
PAINLEVEL_OUTOF10: 4
PAINLEVEL_OUTOF10: 0 - NO PAIN
PAINLEVEL_OUTOF10: 2
PAINLEVEL_OUTOF10: 2
PAINLEVEL_OUTOF10: 0 - NO PAIN

## 2024-06-10 ASSESSMENT — PAIN DESCRIPTION - LOCATION: LOCATION: ABDOMEN

## 2024-06-10 ASSESSMENT — PAIN - FUNCTIONAL ASSESSMENT: PAIN_FUNCTIONAL_ASSESSMENT: 0-10

## 2024-06-10 ASSESSMENT — PAIN DESCRIPTION - DESCRIPTORS: DESCRIPTORS: SORE;SHARP

## 2024-06-10 NOTE — PROGRESS NOTES
OB Postpartum Progress Note    POD #1 s/p rLTCS    S:  Patient overall feeling well.  Pain well controlled with tylenol/motrin.  Passing flatus and voiding freely.  Lochia decreasing.     O:  Vitals:    06/10/24 0800   BP: 92/62   Pulse: 62   Resp: 16   Temp: 36.9 °C (98.4 °F)   SpO2: 98%       OBGyn Exam  Gen in NAD  Abdomen soft, appropriately tender, incision c/d/I with steri strips in place, bandage removed, fundus firm at U-1    Results for orders placed or performed during the hospital encounter of 24 (from the past 24 hour(s))   CBC   Result Value Ref Range    WBC 16.9 (H) 4.4 - 11.3 x10*3/uL    nRBC 0.0 0.0 - 0.0 /100 WBCs    RBC 4.04 4.00 - 5.20 x10*6/uL    Hemoglobin 11.1 (L) 12.0 - 16.0 g/dL    Hematocrit 34.8 (L) 36.0 - 46.0 %    MCV 86 80 - 100 fL    MCH 27.5 26.0 - 34.0 pg    MCHC 31.9 (L) 32.0 - 36.0 g/dL    RDW 13.4 11.5 - 14.5 %    Platelets 195 150 - 450 x10*3/uL       Assessment and Plan  29 y.o. y/o  POD#1 s/p rLTCS, doing well.   Routine postpartum  -O+/rubella immune  -breastfeeding  -normal bps  -male infant, declines circ  2. Routine postop  -PO pain meds  -hgb 14.0/11.1, appropriate for QBL  -ADAT, IS, ambulate  -lovenox for dvt ppx  3. Dispo  -continue inpatient care    Corrine Ferrell MD

## 2024-06-10 NOTE — CARE PLAN
The patient's goals for the shift include bond with baby    The clinical goals for the shift include Return to prepregancy state      Problem: Safety - Adult  Goal: Free from fall injury  Outcome: Progressing     Problem: Postpartum  Goal: Experiences normal postpartum course  Outcome: Progressing  Goal: Appropriate maternal -  bonding  Outcome: Progressing  Goal: Establish and maintain infant feeding pattern for adequate nutrition  Outcome: Progressing  Goal: Incisions, wounds, or drain sites healing without S/S of infection  Outcome: Progressing  Goal: No s/sx infection  Outcome: Progressing  Goal: No s/sx of hemorrhage  Outcome: Progressing  Goal: Minimal s/sx of HDP and BP<160/110  Outcome: Progressing

## 2024-06-10 NOTE — LACTATION NOTE
Lactation Consultant Note  Lactation Consultation  Reason for Consult: Follow-up assessment  Consultant Name: Katerin Ventura    Maternal Information  Has mother  before?: Yes  How long did the mother previously breastfeed?: Older child age 8.5 years,  for 2 years  Previous Maternal Breastfeeding Challenges: Difficult latch  Infant to breast within first 2 hours of birth?: Yes  Exclusive Pump and Bottle Feed: No    Maternal Assessment  Breast Assessment: Medium, Symmetrical, Compressible  Nipple Assessment: Intact, Erect, Sore  Alterations in Nipple Condition: Stage I - pain or irritation with no skin break down  Areola Assessment: Normal    Infant Assessment  Infant Behavior: Awake, Feeding cues observed, Rooting response, Sucking  Infant Assessment: Tongue protrudes over alveolar ridge    Feeding Assessment  Nutrition Source: Breastmilk  Feeding Method: Nursing at the breast  Feeding Position: Cradle, Breast sandwich, Mother needs assistance with latch/positioning, Mother demonstrates good positioning  Suck/Feeding: Sustained, Baby led rhythmically, Audible swallowing  Latch Assessment: Instructed on deep latch, Latch achieved, Wide open mouth < 160, Bursts of sucking, swallowing, and rest, Comfortable latch    LATCH TOOL  Latch: Grasps breast, tongue down, lips flanged, rhythmic sucking  Audible Swallowing: Spontaneous and intermittent (24 hours old)  Type of Nipple: Everted (After stimulation)  Comfort (Breast/Nipple): Filling, red/small blisters/bruises, mild/moderate discomfort  Hold (Positioning): Minimal assist, teach one side, mother does other, staff holds  LATCH Score: 8    Breast Pump  Pump: Hand expression  Frequency: Other (comment) (As needed for poor feeding)  Volume of Milk Production: 1.5  Units of Volume: mL per session    Other OB Lactation Tools  Lactation Tools: Lanolin    Patient Follow-up  Inpatient Lactation Follow-up Needed : Yes  Outpatient Lactation Follow-up:  Recommended    Other OB Lactation Documentation  Maternal Risk Factors:  delivery  Infant Risk Factors: Early term birth 37-39 weeks    Recommendations/Summary  See previous note for history. 3% weight loss. TCB 5.2@22 hours. Mother reports nipples getting sore, endorses bruising, chapping. No bruises visible on nipples.   LC called to bedside for feeding. Mother not keeping infant tucked close and in good alignment. Reinforced ABC's of deep latching, encouraged to tuck infant closer with belly and hips turned in toward mother. Reinforced breast support during feeding. Demonstrated to mother. Reports latch comfortable, feels deeper.    Encouraged to call out with any other questions or concerns.

## 2024-06-10 NOTE — ANESTHESIA POSTPROCEDURE EVALUATION
Patient: Marty Prasad    Procedure Summary       Date: 24 Room / Location: [facility]    Anesthesia Start: 544 Anesthesia Stop: 757    Procedure: Repeat , postpartum care Diagnosis:       Uterine scar from previous  delivery      (Uterine scar from previous  delivery [N85.A])    Surgeons: Hayley Persaud MD Responsible Provider: DIAMOND Hart    Anesthesia Type: spinal ASA Status: 2            Anesthesia Type: spinal    Most Recent Vitals:  Vitals Value Taken Time   BP 92/62 06/10/24 0800   Temp 36.9 °C (98.4 °F) 06/10/24 08   Pulse 62 06/10/24 0800   Resp 16 06/10/24 0800   SpO2 98 % 06/10/24 08       Anesthesia Post Evaluation    Patient location during evaluation: bedside  Patient participation: complete - patient participated  Level of consciousness: awake and alert  Pain management: satisfactory to patient  Multimodal analgesia pain management approach  Airway patency: patent  Cardiovascular status: acceptable  Respiratory status: acceptable  Hydration status: acceptable  Postoperative Nausea and Vomiting: none  Comments: POD #1 from C/S with spinal anesthesia. No redness, swelling, or drainage at puncture site.    Complete resolution of numbness. Patient is able to lift legs, bend at the knees, and ambulate.    Patient denies problems with urination.    Patient denies nausea, headache or severe back pain.         There were no known notable events for this encounter.

## 2024-06-10 NOTE — CARE PLAN
The patient's goals for the shift include bond with baby    The clinical goals for the shift include Return to prepregancy state    Over the shift, the patient met goals

## 2024-06-10 NOTE — PROGRESS NOTES
Spiritual Care Visit    Clinical Encounter Type  Visited With: Patient  Routine Visit: Introduction  Continue Visiting: No                                            Taxonomy  Intended Effects: Preserve dignity and respect, Promote sense of peace, Helping someone feel comforted  Methods: Offer support  Interventions: Share words of hope and inspiration, Provide a Worship item(s)     congratulated the patient on her baby.   gave the patient a baby Bible.   was a supportive presence.

## 2024-06-11 ENCOUNTER — ANESTHESIA (OUTPATIENT)
Dept: OBSTETRICS AND GYNECOLOGY | Facility: HOSPITAL | Age: 29
End: 2024-06-11
Payer: COMMERCIAL

## 2024-06-11 VITALS
RESPIRATION RATE: 20 BRPM | TEMPERATURE: 97.9 F | DIASTOLIC BLOOD PRESSURE: 74 MMHG | OXYGEN SATURATION: 98 % | HEART RATE: 86 BPM | SYSTOLIC BLOOD PRESSURE: 114 MMHG

## 2024-06-11 PROCEDURE — 2500000004 HC RX 250 GENERAL PHARMACY W/ HCPCS (ALT 636 FOR OP/ED): Performed by: STUDENT IN AN ORGANIZED HEALTH CARE EDUCATION/TRAINING PROGRAM

## 2024-06-11 PROCEDURE — 2720000007 HC OR 272 NO HCPCS: Performed by: STUDENT IN AN ORGANIZED HEALTH CARE EDUCATION/TRAINING PROGRAM

## 2024-06-11 PROCEDURE — 2500000001 HC RX 250 WO HCPCS SELF ADMINISTERED DRUGS (ALT 637 FOR MEDICARE OP): Performed by: STUDENT IN AN ORGANIZED HEALTH CARE EDUCATION/TRAINING PROGRAM

## 2024-06-11 PROCEDURE — 59514 CESAREAN DELIVERY ONLY: CPT | Performed by: STUDENT IN AN ORGANIZED HEALTH CARE EDUCATION/TRAINING PROGRAM

## 2024-06-11 RX ORDER — POLYETHYLENE GLYCOL 3350 17 G/17G
17 POWDER, FOR SOLUTION ORAL DAILY
COMMUNITY
Start: 2024-06-11

## 2024-06-11 RX ORDER — OXYCODONE HYDROCHLORIDE 5 MG/1
5 TABLET ORAL EVERY 6 HOURS PRN
Qty: 5 TABLET | Refills: 0 | Status: SHIPPED | OUTPATIENT
Start: 2024-06-11

## 2024-06-11 RX ORDER — IBUPROFEN 600 MG/1
600 TABLET ORAL EVERY 6 HOURS PRN
COMMUNITY
Start: 2024-06-11

## 2024-06-11 RX ORDER — ACETAMINOPHEN 500 MG
1000 TABLET ORAL EVERY 6 HOURS PRN
COMMUNITY
Start: 2024-06-11

## 2024-06-11 RX ADMIN — ACETAMINOPHEN 975 MG: 325 TABLET ORAL at 03:44

## 2024-06-11 RX ADMIN — IBUPROFEN 600 MG: 600 TABLET, FILM COATED ORAL at 03:44

## 2024-06-11 RX ADMIN — PRENATAL VIT W/ FE FUMARATE-FA TAB 27-0.8 MG 1 TABLET: 27-0.8 TAB at 09:42

## 2024-06-11 RX ADMIN — IBUPROFEN 600 MG: 600 TABLET, FILM COATED ORAL at 09:43

## 2024-06-11 RX ADMIN — ACETAMINOPHEN 975 MG: 325 TABLET ORAL at 09:43

## 2024-06-11 RX ADMIN — POLYETHYLENE GLYCOL 3350 17 G: 17 POWDER, FOR SOLUTION ORAL at 09:42

## 2024-06-11 ASSESSMENT — PAIN - FUNCTIONAL ASSESSMENT: PAIN_FUNCTIONAL_ASSESSMENT: 0-10

## 2024-06-11 ASSESSMENT — PAIN SCALES - GENERAL
PAINLEVEL_OUTOF10: 3
PAINLEVEL_OUTOF10: 2
PAIN_LEVEL: 3

## 2024-06-11 ASSESSMENT — PAIN DESCRIPTION - DESCRIPTORS: DESCRIPTORS: SORE;SHARP

## 2024-06-11 ASSESSMENT — PAIN SCALES - PAIN ASSESSMENT IN ADVANCED DEMENTIA (PAINAD): TOTALSCORE: MEDICATION (SEE MAR)

## 2024-06-11 NOTE — CARE PLAN
The patient's goals for the shift include bond with baby, breast feed every 2-3 hours, get some rest     The clinical goals for the shift include VSS, Return to prepregancy state

## 2024-06-11 NOTE — NURSING NOTE
Pt's abdomen softly distended. Bowel sounds present x4 quads. Fluid encouraged, pt given miralax. Dr Shook at bedside discussing DC instructions and incision care with pt and sig other

## 2024-06-11 NOTE — DISCHARGE SUMMARY
Discharge Summary    Admission Date: 2024  Discharge Date: 24     Discharge Diagnosis  Uterine scar from previous  delivery    Hospital Course  Delivery Date: 2024  6:25 AM   Delivery type: , Low Transverse    GA at delivery: 38w5d  Outcome: Living   Anesthesia during delivery: Spinal   Intrapartum complications: None   Feeding method: Breastfeeding Status: Yes     29 year old G3 now  who presented in labor at 38w5d with prior c/s and plan for repeat. On , patient underwent uncomplicated rLTCS. EBL 1085. Hgb equilibrated to 11.1. Rh positive, rubella immune. She remained normotensive throughout her hospital stay. By POD#2, she was meeting all milestones and was discharged home in stable condition.    Pertinent Physical Exam At Time of Discharge  Gen: awake, alert  Head: NCAT  HEENT: moist mucus membranes  Pulm: breathing comfortably on room air  CV: warm and well-perfused  Abd: soft, appropriately tender to palpation, minimally distended, pfannenstiel incision c/d/I with steri strips in place  Neuro: alert and oriented  Psych: appropriate affect     Discharge Meds     Your medication list        START taking these medications        Instructions Last Dose Given Next Dose Due   acetaminophen 500 mg tablet  Commonly known as: Tylenol Extra Strength      Take 2 tablets (1,000 mg) by mouth every 6 hours if needed for mild pain (1 - 3).       ibuprofen 600 mg tablet      Take 1 tablet (600 mg) by mouth every 6 hours if needed for mild pain (1 - 3).       oxyCODONE 5 mg immediate release tablet  Commonly known as: Roxicodone      Take 1 tablet (5 mg) by mouth every 6 hours if needed for moderate pain (4 - 6) or severe pain (7 - 10).       polyethylene glycol 17 gram packet  Commonly known as: Glycolax, Miralax      Take 17 g by mouth once daily.              CONTINUE taking these medications        Instructions Last Dose Given Next Dose Due   breast pump device      Double electric  breast pump       Prenatal Vitamin Plus Low Iron 27 mg iron- 1 mg tablet  Generic drug: prenatal vitamin calcium-iron-folic                     Where to Get Your Medications        These medications were sent to [id number]  [place], [place] - [address]  [address] [place] [address]      Phone: [phone]   oxyCODONE 5 mg immediate release tablet       You can get these medications from any pharmacy    You don't need a prescription for these medications  acetaminophen 500 mg tablet  ibuprofen 600 mg tablet  polyethylene glycol 17 gram packet          Complications Requiring Follow-Up  None    Test Results Pending At Discharge  Pending Labs       No current pending labs.            Outpatient Follow-Up  Future Appointments   Date Time Provider Department Center   6/20/2024  2:20 PM MD NIMESH [facility]   8/6/2024  1:00 PM MD NIMESH Vaz [facility] spent 20 minutes in the professional and overall care of this patient.      Alice Shook MD

## 2024-06-11 NOTE — PROGRESS NOTES
Marty Prasad is a 29 y.o., , who had a , Low Transverse  delivery on 2024  at 38w5d and is now POD2.    She had Neuraxial Anesthesia without immediate complications noted.       Pain well controlled    Vitals:    06/10/24 2034   BP: 112/72   Pulse: 77   Resp: 18   Temp: 36.8 °C (98.2 °F)   SpO2: 100%       Neuraxial site assessed. No visible redness or swelling or drainage. Patient able to ambulate and move all extremities without difficulty. Able to void. No complaints of nausea/vomiting. Tolerating PO intake well. No s/sx of PDPH.     Anesthesia will sign off     Gabi Norwood, RENITA-CRNA

## 2024-06-11 NOTE — CARE PLAN
Over the shift, the patient met the following goals. T  Problem: Safety - Adult  Goal: Free from fall injury  Outcome: Met  Flowsheets (Taken 2024 1100)  Free from fall injury:   Instruct family/caregiver on patient safety   Based on caregiver fall risk screen, instruct family/caregiver to ask for assistance with transferring infant if caregiver noted to have fall risk factors     Problem: Postpartum  Goal: Experiences normal postpartum course  Outcome: Met  Flowsheets (Taken 2024 1100)  Experiences normal postpartum course:   Monitor maternal vital signs   Assess uterine involution   Med administration/monitoring of effect   Fundal and lochia asssessments w/fundal massage, bladder emptying   Assist with identification of coping methods and supprot resources  Goal: Appropriate maternal -  bonding  Outcome: Met  Flowsheets (Taken 2024 1100)  Appropriate maternal- bonding:   Identify family support   24-hour rooming in   Referral to  or  as needed   Assist with identification of coping methods and support resources   Encourage NICU visitation if infant is in NICU   Encourage Mom to interact with staff if visitation is not possible  Goal: Establish and maintain infant feeding pattern for adequate nutrition  Outcome: Met  Flowsheets (Taken 2024 1100)  Establish and maintain infant feeding pattern for adequate nutrition:   Assess  human milk feeding   Refer to lactation consultant as needed   Encourage feeding and/or pumping at least 8x/day  Goal: Incisions, wounds, or drain sites healing without S/S of infection  Outcome: Met  Flowsheets (Taken 2024 1000)  Incisions, wounds, or drain sites healing without sign and symptoms of infection: TWICE DAILY: Assess and document dressing/incision, wound bed, drain sites and surrounding tissue  Goal: No s/sx infection  Outcome: Met  Flowsheets (Taken 2024 1000)  No s/sx of infection:   Hygiene  practices/ana maría-care   Monitor QBL and vital signs   Fundal + lochia assessments w/fundal massage, bladder emptying, intrauterine device when indicated  Goal: No s/sx of hemorrhage  Outcome: Met  Flowsheets (Taken 2024 1000)  No s/sx of hemorrhage:   Monitor QBL and vital signs   Fundal + lochia assessments w/fundal massage, bladder emptying, intrauterine device when indicated  Goal: Minimal s/sx of HDP and BP<160/110  Outcome: Met  Flowsheets (Taken 2024 1000)  Minimal s/sx of HDP and BP <160/110:   Med administration/monitoring of effect   Monitor QBL and vital signs     Problem:  Recovery Care  Goal: Verbalizes understanding of post-op instructions  Outcome: Met  Goal: Manages discomfort  Outcome: Met  Flowsheets (Taken 2024 1000)  Manages discomfort:   Assess and monitor for signs and symptoms of discomfort   Assess patient's pain level regularly and per hospital policy   Administer medications as ordered   Support use of nonpharmacological methods to help control pain such as distraction, imagery, relaxation, and application of heat and cold   Collaborate with interdisciplinary team and patient to determine appropriate pain management plan   Include patient in decisions related to comfort   Report ineffective pain management to physician  Goal: Dressing intact until removed with any drainage marked  Outcome: Met  Flowsheets (Taken 2024 1100)  Dressing intact during recovery with any drainage marked:   During recovery check dressing   Neto any drainage on dressing  Goal: Patient vital signs are stable  Outcome: Met  Flowsheets (Taken 2024 1140)  Vital signs are medically acceptable:   Assess vital signs   Leg exercises   Turn, cough, and deep breathe  Goal: Urine output is 0.5 mL/kg/hr or more  Outcome: Met  Goal: Fundus firm at midline  Outcome: Met  Flowsheets (Taken 2024 1100)  Fundus firm at midline:   Assess fundus   Do lochia check     Problem: Pain - Adult  Goal:  Verbalizes/displays adequate comfort level or baseline comfort level  Outcome: Met  Flowsheets (Taken 6/11/2024 1140)  Verbalizes/displays adequate comfort level or baseline comfort level:   Encourage patient to monitor pain and request assistance   Assess pain using appropriate pain scale   Administer analgesics based on type and severity of pain and evaluate response   Implement non-pharmacological measures as appropriate and evaluate response   Consider cultural and social influences on pain and pain management   Notify Licensed Independent Practitioner if interventions unsuccessful or patient reports new pain   The patient's goals for the shift include go home!    The clinical goals for the shift include adequate lacth and effective breastfeeding.  All goals met

## 2024-06-11 NOTE — ANESTHESIA POSTPROCEDURE EVALUATION
Patient: Marty Prasad    Procedure Summary       Date: 24 Room / Location: [facility]    Anesthesia Start:  Anesthesia Stop:     Procedure: Repeat , postpartum care Diagnosis:       Uterine scar from previous  delivery      (Uterine scar from previous  delivery [N85.A])    Surgeons: Alice Shook MD Responsible Provider:     Anesthesia Type: Not recorded ASA Status: Not recorded            Anesthesia Type: No value filed.  MOST RECENT VITALS:        Vitals Value Taken Time   /72 24 0841   Temp 36.8 24 0841   Pulse 77 24 0841   Resp 18 24 0841   SpO2 100 24 0841       Anesthesia Post Evaluation    Patient location during evaluation: bedside  Patient participation: complete - patient participated  Level of consciousness: awake and alert  Pain score: 3  Pain management: adequate  Multimodal analgesia pain management approach  Airway patency: patent  Two or more strategies used to mitigate risk of obstructive sleep apnea  Cardiovascular status: acceptable  Respiratory status: acceptable  Hydration status: acceptable  Postoperative Nausea and Vomiting: none  Comments: No complaints        No notable events documented.

## 2024-06-12 ENCOUNTER — APPOINTMENT (OUTPATIENT)
Dept: OBSTETRICS AND GYNECOLOGY | Facility: CLINIC | Age: 29
End: 2024-06-12
Payer: COMMERCIAL

## 2024-06-18 ENCOUNTER — APPOINTMENT (OUTPATIENT)
Dept: OBSTETRICS AND GYNECOLOGY | Facility: CLINIC | Age: 29
End: 2024-06-18
Payer: COMMERCIAL

## 2024-06-19 ENCOUNTER — APPOINTMENT (OUTPATIENT)
Dept: OBSTETRICS AND GYNECOLOGY | Facility: CLINIC | Age: 29
End: 2024-06-19
Payer: COMMERCIAL

## 2024-06-19 NOTE — PROGRESS NOTES
Subjective:  Marty Prasad is a 29 y.o.  now POD#11 from rLTCS presenting for post-operative incision check. On , patient underwent non-scheduled rLTCS for labor at 38.5wga without complication.     She is overall doing well at home. She is taking nothing for pain. She is eating, drinking, voiding, and having bowel movements.  Lochia light. Baby is breast feeding and doing well. Mood good.     Objective:  Vitals:    24 1432   BP: 127/85     Gen: NAD  Abd: soft, NTND  Skin: clean, dry, and intact; no erythema, discharge, or bleeding  Neuro: alert and oriented  Psych: appropriate affect    Assessment and Plan:  Marty Prasad is a  now POD#11 from rLTCS presenting for post-operative incision check    Post-Op  -Meeting milestones  -Incision well-approximated without si/sx of infection  -Continue pelvic rest and lifting precautions    Postpartum  -Feeding: breast  -Contraception: POPs bridge to vasectomy, Rx'ed today  -Mood: EPDS 2    RTC in 4 weeks for postpartum visit    Alice Shook MD

## 2024-06-20 ENCOUNTER — APPOINTMENT (OUTPATIENT)
Dept: OBSTETRICS AND GYNECOLOGY | Facility: CLINIC | Age: 29
End: 2024-06-20
Payer: COMMERCIAL

## 2024-06-20 VITALS
BODY MASS INDEX: 28.39 KG/M2 | SYSTOLIC BLOOD PRESSURE: 127 MMHG | WEIGHT: 170.4 LBS | DIASTOLIC BLOOD PRESSURE: 85 MMHG | HEIGHT: 65 IN

## 2024-06-20 DIAGNOSIS — Z09 POSTOP CHECK: Primary | ICD-10-CM

## 2024-06-20 DIAGNOSIS — Z30.011 ENCOUNTER FOR INITIAL PRESCRIPTION OF CONTRACEPTIVE PILLS: ICD-10-CM

## 2024-06-20 PROBLEM — Z34.90 PRENATAL CARE, ANTEPARTUM (HHS-HCC): Status: RESOLVED | Noted: 2024-05-20 | Resolved: 2024-06-20

## 2024-06-20 RX ORDER — NORETHINDRONE 0.35 MG/1
1 TABLET ORAL DAILY
Qty: 84 TABLET | Refills: 3 | Status: SHIPPED | OUTPATIENT
Start: 2024-06-20 | End: 2025-06-20

## 2024-06-20 ASSESSMENT — EDINBURGH POSTNATAL DEPRESSION SCALE (EPDS)
I HAVE BEEN ANXIOUS OR WORRIED FOR NO GOOD REASON: NO, NOT AT ALL
THE THOUGHT OF HARMING MYSELF HAS OCCURRED TO ME: NEVER
TOTAL SCORE: 2
I HAVE BEEN SO UNHAPPY THAT I HAVE BEEN CRYING: ONLY OCCASIONALLY
I HAVE FELT SAD OR MISERABLE: NO, NOT AT ALL
I HAVE LOOKED FORWARD WITH ENJOYMENT TO THINGS: AS MUCH AS I EVER DID
I HAVE BEEN SO UNHAPPY THAT I HAVE HAD DIFFICULTY SLEEPING: NOT AT ALL
THINGS HAVE BEEN GETTING ON TOP OF ME: NO, MOST OF THE TIME I HAVE COPED QUITE WELL
I HAVE FELT SCARED OR PANICKY FOR NO GOOD REASON: NO, NOT AT ALL
I HAVE BEEN ABLE TO LAUGH AND SEE THE FUNNY SIDE OF THINGS: AS MUCH AS I ALWAYS COULD
I HAVE BLAMED MYSELF UNNECESSARILY WHEN THINGS WENT WRONG: NO, NEVER

## 2024-07-16 ENCOUNTER — APPOINTMENT (OUTPATIENT)
Dept: OBSTETRICS AND GYNECOLOGY | Facility: CLINIC | Age: 29
End: 2024-07-16
Payer: COMMERCIAL

## 2024-07-16 VITALS
HEIGHT: 65 IN | DIASTOLIC BLOOD PRESSURE: 83 MMHG | SYSTOLIC BLOOD PRESSURE: 129 MMHG | BODY MASS INDEX: 27.42 KG/M2 | WEIGHT: 164.6 LBS

## 2024-07-16 DIAGNOSIS — Z12.4 CERVICAL CANCER SCREENING: ICD-10-CM

## 2024-07-16 LAB
BASOPHILS # BLD AUTO: 0.04 X10*3/UL (ref 0–0.1)
BASOPHILS NFR BLD AUTO: 0.5 %
EOSINOPHIL # BLD AUTO: 0.32 X10*3/UL (ref 0–0.7)
EOSINOPHIL NFR BLD AUTO: 3.8 %
ERYTHROCYTE [DISTWIDTH] IN BLOOD BY AUTOMATED COUNT: 12.8 % (ref 11.5–14.5)
HCT VFR BLD AUTO: 47.1 % (ref 36–46)
HGB BLD-MCNC: 14.3 G/DL (ref 12–16)
IMM GRANULOCYTES # BLD AUTO: 0.02 X10*3/UL (ref 0–0.7)
IMM GRANULOCYTES NFR BLD AUTO: 0.2 % (ref 0–0.9)
LYMPHOCYTES # BLD AUTO: 1.64 X10*3/UL (ref 1.2–4.8)
LYMPHOCYTES NFR BLD AUTO: 19.7 %
MCH RBC QN AUTO: 27 PG (ref 26–34)
MCHC RBC AUTO-ENTMCNC: 30.4 G/DL (ref 32–36)
MCV RBC AUTO: 89 FL (ref 80–100)
MONOCYTES # BLD AUTO: 0.47 X10*3/UL (ref 0.1–1)
MONOCYTES NFR BLD AUTO: 5.6 %
NEUTROPHILS # BLD AUTO: 5.85 X10*3/UL (ref 1.2–7.7)
NEUTROPHILS NFR BLD AUTO: 70.2 %
NRBC BLD-RTO: 0 /100 WBCS (ref 0–0)
PLATELET # BLD AUTO: 295 X10*3/UL (ref 150–450)
RBC # BLD AUTO: 5.29 X10*6/UL (ref 4–5.2)
WBC # BLD AUTO: 8.3 X10*3/UL (ref 4.4–11.3)

## 2024-07-16 PROCEDURE — 36415 COLL VENOUS BLD VENIPUNCTURE: CPT

## 2024-07-16 ASSESSMENT — EDINBURGH POSTNATAL DEPRESSION SCALE (EPDS)
TOTAL SCORE: 1
I HAVE BEEN SO UNHAPPY THAT I HAVE HAD DIFFICULTY SLEEPING: NOT AT ALL
THINGS HAVE BEEN GETTING ON TOP OF ME: NO, I HAVE BEEN COPING AS WELL AS EVER
I HAVE FELT SAD OR MISERABLE: NO, NOT AT ALL
I HAVE LOOKED FORWARD WITH ENJOYMENT TO THINGS: AS MUCH AS I EVER DID
I HAVE BEEN ABLE TO LAUGH AND SEE THE FUNNY SIDE OF THINGS: AS MUCH AS I ALWAYS COULD
THE THOUGHT OF HARMING MYSELF HAS OCCURRED TO ME: NEVER
I HAVE FELT SCARED OR PANICKY FOR NO GOOD REASON: NO, NOT AT ALL
I HAVE BEEN ANXIOUS OR WORRIED FOR NO GOOD REASON: NO, NOT AT ALL
I HAVE BLAMED MYSELF UNNECESSARILY WHEN THINGS WENT WRONG: NOT VERY OFTEN
I HAVE BEEN SO UNHAPPY THAT I HAVE BEEN CRYING: NO, NEVER

## 2024-07-16 NOTE — PROGRESS NOTES
Subjective:  Marty Reid is a female 29 y.o. year old  who is s/p rLTCS presenting for 6 week postpartum visit. Overall, she feels . She is able to perform daily tasks without difficulty. Lochia has not yet resolved. Still having intermittent bleeding, filled a pad yesterday, no bleeding today. She has not had a period since that time. She has not resumed intercourse and plans POPs bridge to vasectomy for contraception. She feels well-supported at home. She is currently breast feeding and baby is doing well. Mood good.     Objective:  Vitals:    24 1403   BP: 129/83     Gen: awake, alert  Head: NCAT  HEENT: moist mucus membranes  Pulm: breathing comfortably on room air  CV: warm and well-perfused  Abd: pfannenstiel incision well-approximated without erythema, warmth, or drainage  : no blood or discharge in vault, brown/gray membranes protruding through cervix partially removed with ring forcep, pap collected. Bimanual without fundal tenderness  Neuro: alert and oriented  Psych: appropriate affect     Assessment and Plan:  Marty Reid is a female 29 y.o. year old  who is s/p rLTCS presenting for 6 week postpartum visit    Postpartum  -Postpartum care: may resume normal activities including exercise, work, intercourse  -Contraception: POPs bridge to vasectomy, Rx'ed at last visit  -Feeding: breast  -Mood: EPDS 1  -Pap: collected today     Concern for Retained Membranes  -Persistent, intermittent bleeding since delivery  -Exam today with possible membranes protruding through cervix. Partially removed with ring forcep and sent to pathology  -CBC with diff collected  -Otherwise well-appearing without f/c, no fundal tenderness on exam, low suspicion for endometritis  -For TVUS  -Did discuss options for management including office MVA vs D&C in OR. To follow-up TVUS results and make plan from there    Will call with results of TVUS    Alice Shook MD

## 2024-07-19 LAB
LABORATORY COMMENT REPORT: NORMAL
PATH REPORT.FINAL DX SPEC: NORMAL
PATH REPORT.GROSS SPEC: NORMAL
PATH REPORT.RELEVANT HX SPEC: NORMAL
PATH REPORT.TOTAL CANCER: NORMAL

## 2024-07-24 LAB
CYTOLOGY CMNT CVX/VAG CYTO-IMP: NORMAL
LAB AP HPV GENOTYPE QUESTION: NO
LAB AP HPV HR: NORMAL
LABORATORY COMMENT REPORT: NORMAL
PATH REPORT.TOTAL CANCER: NORMAL

## 2024-08-06 ENCOUNTER — APPOINTMENT (OUTPATIENT)
Dept: OBSTETRICS AND GYNECOLOGY | Facility: CLINIC | Age: 29
End: 2024-08-06
Payer: COMMERCIAL

## (undated) DEVICE — SUTURE, PDS II, 0 36 IN, CT-1, VIOLET

## (undated) DEVICE — Device

## (undated) DEVICE — STRIP, SKIN CLOSURE, COMPOUND BENZION TINCTURE 0.6ML

## (undated) DEVICE — CAP, BABY, 4 X 6 IN, PINK/BLUE/WHITE

## (undated) DEVICE — SUTURE, MONOCRYL, 2-0, 36 IN, CT-1, UNDYED

## (undated) DEVICE — APPLICATOR, CHLORAPREP, W/ORANGE TINT, 26ML

## (undated) DEVICE — GLOVE, SURGICAL, PROTEXIS PI , 6.0, PF, LF

## (undated) DEVICE — MASK, SURGICAL, SPLASHGUARD, THE PROTECTOR, FOG FREE, BLUE/ORANGE

## (undated) DEVICE — COVER HANDLE LIGHT, STERIS, BLUE, STERILE

## (undated) DEVICE — PREP TRAY, VAGINAL

## (undated) DEVICE — SUTURE, VICRYL, 0, 36 IN, CT-1, UNDYED

## (undated) DEVICE — STRIP, SKIN CLOSURE, STERI STRIP, REINFORCED, 0.5 X 4 IN

## (undated) DEVICE — TUBING, SUCTION, CONNECTING, 9/32 X 10FT, LF

## (undated) DEVICE — TRAY, SPINAL, PENCAN 25 GA X 3.5"

## (undated) DEVICE — MASK, SURGICAL, STANDARD, THE LITE ONE, BLUE

## (undated) DEVICE — SLEEVE, SCD EXPRESS, KNEE LENGTH-MEDIUM

## (undated) DEVICE — DRESSING, NON-ADHERENT, TELFA, OUCHLESS, 3 X 8 IN, STERILE

## (undated) DEVICE — GLOVE, SURGICAL, PROTEXIS PI , 6.5, PF, LF

## (undated) DEVICE — TRAY, SURESTEP, SILICONE DRAINAGE BAG, STATLOCK, 16FR

## (undated) DEVICE — SUTURE, MONOCRYL, 4-0, 27 IN, PS-2, UNDYED

## (undated) DEVICE — DRESSING, ADHESIVE, ISLAND, TELFA, 4 X 10 IN